# Patient Record
Sex: FEMALE | Race: WHITE | NOT HISPANIC OR LATINO | URBAN - METROPOLITAN AREA
[De-identification: names, ages, dates, MRNs, and addresses within clinical notes are randomized per-mention and may not be internally consistent; named-entity substitution may affect disease eponyms.]

---

## 2017-06-13 ENCOUNTER — INPATIENT (INPATIENT)
Facility: HOSPITAL | Age: 42
LOS: 0 days | Discharge: STILL ADMITTED OR ADMINISTRATIVE | End: 2017-06-13
Attending: PSYCHIATRY & NEUROLOGY | Admitting: PSYCHIATRY & NEUROLOGY

## 2017-06-13 ENCOUNTER — OUTPATIENT (OUTPATIENT)
Dept: OUTPATIENT SERVICES | Facility: HOSPITAL | Age: 42
LOS: 1 days | Discharge: STILL ADMITTED OR ADMINISTRATIVE | End: 2017-06-13

## 2017-06-13 ENCOUNTER — INPATIENT (INPATIENT)
Facility: HOSPITAL | Age: 42
LOS: 1 days | Discharge: PSYCHIATRIC OR SUBSTANCE USE FACILITY | End: 2017-06-15
Attending: HOSPITALIST | Admitting: HOSPITALIST
Payer: COMMERCIAL

## 2017-06-13 VITALS
DIASTOLIC BLOOD PRESSURE: 80 MMHG | TEMPERATURE: 98 F | OXYGEN SATURATION: 96 % | RESPIRATION RATE: 16 BRPM | HEART RATE: 89 BPM | SYSTOLIC BLOOD PRESSURE: 127 MMHG

## 2017-06-13 DIAGNOSIS — C71.2 MALIGNANT NEOPLASM OF TEMPORAL LOBE: Chronic | ICD-10-CM

## 2017-06-13 DIAGNOSIS — F31.10 BIPOLAR DISORDER, CURRENT EPISODE MANIC WITHOUT PSYCHOTIC FEATURES, UNSPECIFIED: ICD-10-CM

## 2017-06-13 LAB
ALBUMIN SERPL ELPH-MCNC: 4.5 G/DL — SIGNIFICANT CHANGE UP (ref 3.3–5)
ALP SERPL-CCNC: 112 U/L — SIGNIFICANT CHANGE UP (ref 40–120)
ALT FLD-CCNC: 11 U/L — SIGNIFICANT CHANGE UP (ref 4–33)
APAP SERPL-MCNC: < 15 UG/ML — LOW (ref 15–25)
APPEARANCE UR: CLEAR — SIGNIFICANT CHANGE UP
AST SERPL-CCNC: 17 U/L — SIGNIFICANT CHANGE UP (ref 4–32)
BARBITURATES MEASUREMENT: NEGATIVE — SIGNIFICANT CHANGE UP
BASOPHILS # BLD AUTO: 0.02 K/UL — SIGNIFICANT CHANGE UP (ref 0–0.2)
BASOPHILS NFR BLD AUTO: 0.3 % — SIGNIFICANT CHANGE UP (ref 0–2)
BENZODIAZ SERPL-MCNC: NEGATIVE — SIGNIFICANT CHANGE UP
BILIRUB SERPL-MCNC: 0.3 MG/DL — SIGNIFICANT CHANGE UP (ref 0.2–1.2)
BILIRUB UR-MCNC: NEGATIVE — SIGNIFICANT CHANGE UP
BLOOD UR QL VISUAL: NEGATIVE — SIGNIFICANT CHANGE UP
BUN SERPL-MCNC: 13 MG/DL — SIGNIFICANT CHANGE UP (ref 7–23)
CALCIUM SERPL-MCNC: 8.3 MG/DL — LOW (ref 8.4–10.5)
CHLORIDE SERPL-SCNC: 102 MMOL/L — SIGNIFICANT CHANGE UP (ref 98–107)
CHOLEST SERPL-MCNC: 236 MG/DL — HIGH (ref 120–199)
CO2 SERPL-SCNC: 20 MMOL/L — LOW (ref 22–31)
COLOR SPEC: YELLOW — SIGNIFICANT CHANGE UP
CREAT SERPL-MCNC: 1.06 MG/DL — SIGNIFICANT CHANGE UP (ref 0.5–1.3)
EOSINOPHIL # BLD AUTO: 0.05 K/UL — SIGNIFICANT CHANGE UP (ref 0–0.5)
EOSINOPHIL NFR BLD AUTO: 0.7 % — SIGNIFICANT CHANGE UP (ref 0–6)
ETHANOL BLD-MCNC: < 10 MG/DL — SIGNIFICANT CHANGE UP
GLUCOSE SERPL-MCNC: 78 MG/DL — SIGNIFICANT CHANGE UP (ref 70–99)
GLUCOSE UR-MCNC: NEGATIVE — SIGNIFICANT CHANGE UP
HBA1C BLD-MCNC: 6 % — HIGH (ref 4–5.6)
HCG SERPL-ACNC: < 5 MIU/ML — SIGNIFICANT CHANGE UP
HCT VFR BLD CALC: 36.1 % — SIGNIFICANT CHANGE UP (ref 34.5–45)
HDLC SERPL-MCNC: 40 MG/DL — LOW (ref 45–65)
HGB BLD-MCNC: 11.8 G/DL — SIGNIFICANT CHANGE UP (ref 11.5–15.5)
IMM GRANULOCYTES NFR BLD AUTO: 0.1 % — SIGNIFICANT CHANGE UP (ref 0–1.5)
KETONES UR-MCNC: NEGATIVE — SIGNIFICANT CHANGE UP
LEUKOCYTE ESTERASE UR-ACNC: NEGATIVE — SIGNIFICANT CHANGE UP
LIPID PNL WITH DIRECT LDL SERPL: 156 MG/DL — SIGNIFICANT CHANGE UP
LYMPHOCYTES # BLD AUTO: 1.18 K/UL — SIGNIFICANT CHANGE UP (ref 1–3.3)
LYMPHOCYTES # BLD AUTO: 16.5 % — SIGNIFICANT CHANGE UP (ref 13–44)
MAGNESIUM SERPL-MCNC: 2.1 MG/DL — SIGNIFICANT CHANGE UP (ref 1.6–2.6)
MCHC RBC-ENTMCNC: 27.9 PG — SIGNIFICANT CHANGE UP (ref 27–34)
MCHC RBC-ENTMCNC: 32.7 % — SIGNIFICANT CHANGE UP (ref 32–36)
MCV RBC AUTO: 85.3 FL — SIGNIFICANT CHANGE UP (ref 80–100)
MONOCYTES # BLD AUTO: 0.48 K/UL — SIGNIFICANT CHANGE UP (ref 0–0.9)
MONOCYTES NFR BLD AUTO: 6.7 % — SIGNIFICANT CHANGE UP (ref 2–14)
MUCOUS THREADS # UR AUTO: SIGNIFICANT CHANGE UP
NEUTROPHILS # BLD AUTO: 5.4 K/UL — SIGNIFICANT CHANGE UP (ref 1.8–7.4)
NEUTROPHILS NFR BLD AUTO: 75.7 % — SIGNIFICANT CHANGE UP (ref 43–77)
NITRITE UR-MCNC: NEGATIVE — SIGNIFICANT CHANGE UP
PH UR: 5.5 — SIGNIFICANT CHANGE UP (ref 4.6–8)
PHOSPHATE SERPL-MCNC: 5 MG/DL — HIGH (ref 2.5–4.5)
PLATELET # BLD AUTO: 188 K/UL — SIGNIFICANT CHANGE UP (ref 150–400)
PMV BLD: 12.1 FL — SIGNIFICANT CHANGE UP (ref 7–13)
POTASSIUM SERPL-MCNC: 3.9 MMOL/L — SIGNIFICANT CHANGE UP (ref 3.5–5.3)
POTASSIUM SERPL-SCNC: 3.9 MMOL/L — SIGNIFICANT CHANGE UP (ref 3.5–5.3)
PROT SERPL-MCNC: 7 G/DL — SIGNIFICANT CHANGE UP (ref 6–8.3)
PROT UR-MCNC: 10 — SIGNIFICANT CHANGE UP
RBC # BLD: 4.23 M/UL — SIGNIFICANT CHANGE UP (ref 3.8–5.2)
RBC # FLD: 14 % — SIGNIFICANT CHANGE UP (ref 10.3–14.5)
RBC CASTS # UR COMP ASSIST: SIGNIFICANT CHANGE UP (ref 0–?)
SALICYLATES SERPL-MCNC: < 5 MG/DL — LOW (ref 15–30)
SODIUM SERPL-SCNC: 143 MMOL/L — SIGNIFICANT CHANGE UP (ref 135–145)
SP GR SPEC: 1.01 — SIGNIFICANT CHANGE UP (ref 1–1.03)
SQUAMOUS # UR AUTO: SIGNIFICANT CHANGE UP
TRIGL SERPL-MCNC: 193 MG/DL — HIGH (ref 10–149)
TSH SERPL-MCNC: 1.68 UIU/ML — SIGNIFICANT CHANGE UP (ref 0.27–4.2)
UROBILINOGEN FLD QL: NORMAL E.U. — SIGNIFICANT CHANGE UP (ref 0.1–0.2)
WBC # BLD: 7.14 K/UL — SIGNIFICANT CHANGE UP (ref 3.8–10.5)
WBC # FLD AUTO: 7.14 K/UL — SIGNIFICANT CHANGE UP (ref 3.8–10.5)
WBC UR QL: SIGNIFICANT CHANGE UP (ref 0–?)

## 2017-06-13 PROCEDURE — 90792 PSYCH DIAG EVAL W/MED SRVCS: CPT

## 2017-06-13 PROCEDURE — 70450 CT HEAD/BRAIN W/O DYE: CPT | Mod: 26

## 2017-06-13 RX ORDER — ALPRAZOLAM 0.25 MG
1 TABLET ORAL ONCE
Qty: 0 | Refills: 0 | Status: DISCONTINUED | OUTPATIENT
Start: 2017-06-13 | End: 2017-06-13

## 2017-06-13 RX ADMIN — Medication 1 MILLIGRAM(S): at 23:34

## 2017-06-13 NOTE — ED ADULT NURSE NOTE - OBJECTIVE STATEMENT
Pt presents to ED r#24 Aox4, in NAD, sent in by [facility] intake prior to admission to [facility] for CT head/ medical clearance. Parents states pt has been more manic, increased destructive and erratic behaviors. ON chemo/ radiation for brain CA, CT head every 3 months, last on May 13 (s/p fall r/t low BP 70/40, negative for acute changes). MRI on May 17 also showed no changes, as per family. Family also reports decreased PO intake since yesterday. Pt denies pain/ N/V/ CP/ SOB/ other acute complaints. Pt is tearful at this time, otherwise cooperative, respirations even and unlabored, VSS. MD at bedside. Will continue to monitor.

## 2017-06-13 NOTE — ED PROVIDER NOTE - MEDICAL DECISION MAKING DETAILS
39F with PMHx astrocytoma p/w worsening behavioral changes concerning for uncontrolled bipolar d/o vs. organic behavioral abnormality 2/2 underlying malignancy with resection. CT Head, labs, EKG, neuro consult to r/o seizures. Re-assess. GRAEME aware

## 2017-06-13 NOTE — ED PROVIDER NOTE - OBJECTIVE STATEMENT
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 History obtained  mostly from father at bedside. As per father, pt has been seeing a neuropsychologist (Dr. Brittney Estes, [place]) and a clinical psychologist weekly. Her neuropsychologist recently changes (to Dr. Estes's  Dandre Franklyn) and has possibly made some medication changes but is not sure of the details. Pt receives regular screening MRIs and CT Heads, last CT Head unchanged 5/13/17 and MRI Head 3 weeks ago also unchanged from baseline.     Pt was to be voluntarily admitted to inpatient [facility] for destructive behavior and hypersexuality with large mood swings thought to possibly be Bipolar D/o. Over the past few weeks she has been performing erratic behaviors, such as taking out money from her bank account and giving it out to her friends, exchanging her flakito ring for a cubic zirconium, and being self-destructive. She is  with 2 children.     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 History obtained  mostly from father at bedside. As per father, pt has been seeing a neuropsychologist (Dr. Brittney Estes, [place]) and a clinical psychologist weekly. Her neuropsychologist recently changes (to Dr. Estes's  Dr. Dandre Estes) and has possibly made some medication changes but is not sure of the details. Pt receives regular screening MRIs and CT Heads, last CT Head unchanged 5/13/17 and MRI Head 3 weeks ago also unchanged from baseline.     Pt was to be voluntarily admitted to inpatient [facility] for destructive behavior and hypersexuality with large mood swings thought to possibly be Bipolar D/o. Over the past few years (since her sx) she has been performing erratic behaviors, such as taking out money from her bank account and giving it out to her friends, exchanging her grandmother's flakito ring for fake, and crying for no reason. She is  with 2 children.     Meds: Fluoxetine 60mg daily, Topamax 225mg BID (125mg am, 100mg qhs), Abilify 30mg QD, Adderall 30mg BID, Xanax 1mg BID or TID, Nexium 40mg QD, Probiotic QD Pt is a 42F with PMHx astrocytoma (right temporal lobe) s/p resection x2 c/b perioperative CVA (2015) and adjuvant chemotherapy/radiation ([facility]) p/w worsening behavioral changes admitted to [facility] earlier today but then transferred to [facility] ED for medical clearance prior to inpatient psych admission. History obtained  mostly from father at bedside. As per father, pt has been seeing a neuropsychologist (Dr. Brittney Estes, [place]) and a clinical psychologist weekly. Her neuropsychologist recently changes (to Dr. Estes's  Dr. Dandre Estes) and has possibly made some medication changes but is not sure of the details. Pt receives regular screening MRIs and CT Heads, last CT Head unchanged 5/13/17 and MRI Head 3 weeks ago also unchanged from baseline.     Pt was to be voluntarily admitted to inpatient [facility] for destructive behavior and hypersexuality with large mood swings thought to possibly be Bipolar D/o. Over the past few years (since her sx) she has been performing erratic behaviors, such as taking out money from her bank account and giving it out to her friends, exchanging her grandmother's flakito ring for fake, and crying for no reason. She is  with 2 children.     Meds: Fluoxetine 60mg daily, Topamax 225mg BID (125mg am, 100mg qhs), Abilify 30mg QD, Adderall 30mg BID, Xanax 1mg BID or TID, Nexium 40mg QD, Probiotic QD    Father (Dr. Fernando) [phone]

## 2017-06-13 NOTE — ED PROVIDER NOTE - ATTENDING CONTRIBUTION TO CARE
AJM: Pt seen with resident and agree with above note. Pt is a 42F with PMHx astrocytoma (right temporal lobe) s/p resection x2 c/b perioperative CVA (2015) and adjuvant chemotherapy/radiation ([facility]) p/w worsening behavioral changes admitted to [facility] earlier today but then transferred to [facility] ED for medical clearance prior to inpatient psych admission. Sent here for ct head. Pt with symptoms c/w ashley, spending, hypersexuality, labile affect. psych was concerned symptoms might be due to possible temporal seziures given pts medical history and lack of prior psych issues. requesting neuro eval with psych following. labs already drawn by psych. head ct, neuro consult. admit. no SI at this time.

## 2017-06-13 NOTE — ED ADULT TRIAGE NOTE - CHIEF COMPLAINT QUOTE
EMS states" patient was sent in from crisis center with change in behavior to get checked by psychiatrist" .h/o cancer brain with 2 surgeries in the past with chemo/radiation. patient also has bipolar history. patient also fell on May 13th and last MRI was 3 weeks ago with no changes.c/o head ache. as per father patient is not making right decisions

## 2017-06-13 NOTE — ED PROVIDER NOTE - EYES, MLM
Clear bilaterally, pupils equal, round and reactive to light. Decreased peripheral vision superiorly, R>L

## 2017-06-13 NOTE — ED PROVIDER NOTE - PMH
Anxiety    Asthma  never intubated or hospitalized for ot, uses symbicort and proventil  Astrocytoma  Temporal lobe  Chronic sinusitis    Dislocation of shoulder  x 4 -  2000 to 2005  GERD (gastroesophageal reflux disease)    Hypothyroidism    Obesity (BMI 30-39.9)    Spine fracture  lumbar 2005

## 2017-06-14 DIAGNOSIS — R41.82 ALTERED MENTAL STATUS, UNSPECIFIED: ICD-10-CM

## 2017-06-14 DIAGNOSIS — F41.9 ANXIETY DISORDER, UNSPECIFIED: ICD-10-CM

## 2017-06-14 DIAGNOSIS — K21.9 GASTRO-ESOPHAGEAL REFLUX DISEASE WITHOUT ESOPHAGITIS: ICD-10-CM

## 2017-06-14 DIAGNOSIS — E03.9 HYPOTHYROIDISM, UNSPECIFIED: ICD-10-CM

## 2017-06-14 DIAGNOSIS — F12.10 CANNABIS ABUSE, UNCOMPLICATED: ICD-10-CM

## 2017-06-14 DIAGNOSIS — F69 UNSPECIFIED DISORDER OF ADULT PERSONALITY AND BEHAVIOR: ICD-10-CM

## 2017-06-14 DIAGNOSIS — F39 UNSPECIFIED MOOD [AFFECTIVE] DISORDER: ICD-10-CM

## 2017-06-14 DIAGNOSIS — C71.9 MALIGNANT NEOPLASM OF BRAIN, UNSPECIFIED: ICD-10-CM

## 2017-06-14 DIAGNOSIS — Z29.9 ENCOUNTER FOR PROPHYLACTIC MEASURES, UNSPECIFIED: ICD-10-CM

## 2017-06-14 DIAGNOSIS — F31.10 BIPOLAR DISORDER, CURRENT EPISODE MANIC WITHOUT PSYCHOTIC FEATURES, UNSPECIFIED: ICD-10-CM

## 2017-06-14 DIAGNOSIS — G93.41 METABOLIC ENCEPHALOPATHY: ICD-10-CM

## 2017-06-14 LAB
AMPHET UR-MCNC: NEGATIVE — SIGNIFICANT CHANGE UP
BARBITURATES UR SCN-MCNC: NEGATIVE — SIGNIFICANT CHANGE UP
BASOPHILS # BLD AUTO: 0 K/UL — SIGNIFICANT CHANGE UP (ref 0–0.2)
BASOPHILS NFR BLD AUTO: 0 % — SIGNIFICANT CHANGE UP (ref 0–2)
BENZODIAZ UR-MCNC: NEGATIVE — SIGNIFICANT CHANGE UP
BUN SERPL-MCNC: 7 MG/DL — SIGNIFICANT CHANGE UP (ref 7–23)
CALCIUM SERPL-MCNC: 9 MG/DL — SIGNIFICANT CHANGE UP (ref 8.4–10.5)
CANNABINOIDS UR-MCNC: POSITIVE — SIGNIFICANT CHANGE UP
CHLORIDE SERPL-SCNC: 103 MMOL/L — SIGNIFICANT CHANGE UP (ref 98–107)
CO2 SERPL-SCNC: 21 MMOL/L — LOW (ref 22–31)
COCAINE METAB.OTHER UR-MCNC: NEGATIVE — SIGNIFICANT CHANGE UP
CREAT SERPL-MCNC: 0.91 MG/DL — SIGNIFICANT CHANGE UP (ref 0.5–1.3)
EOSINOPHIL # BLD AUTO: 0.05 K/UL — SIGNIFICANT CHANGE UP (ref 0–0.5)
EOSINOPHIL NFR BLD AUTO: 1.4 % — SIGNIFICANT CHANGE UP (ref 0–6)
GLUCOSE SERPL-MCNC: 100 MG/DL — HIGH (ref 70–99)
HCT VFR BLD CALC: 33.3 % — LOW (ref 34.5–45)
HGB BLD-MCNC: 10.8 G/DL — LOW (ref 11.5–15.5)
HIV1 AG SER QL: SIGNIFICANT CHANGE UP
HIV1+2 AB SPEC QL: SIGNIFICANT CHANGE UP
IMM GRANULOCYTES NFR BLD AUTO: 0.3 % — SIGNIFICANT CHANGE UP (ref 0–1.5)
LYMPHOCYTES # BLD AUTO: 0.6 K/UL — LOW (ref 1–3.3)
LYMPHOCYTES # BLD AUTO: 16.9 % — SIGNIFICANT CHANGE UP (ref 13–44)
MAGNESIUM SERPL-MCNC: 2.1 MG/DL — SIGNIFICANT CHANGE UP (ref 1.6–2.6)
MCHC RBC-ENTMCNC: 27.1 PG — SIGNIFICANT CHANGE UP (ref 27–34)
MCHC RBC-ENTMCNC: 32.4 % — SIGNIFICANT CHANGE UP (ref 32–36)
MCV RBC AUTO: 83.7 FL — SIGNIFICANT CHANGE UP (ref 80–100)
METHADONE UR-MCNC: NEGATIVE — SIGNIFICANT CHANGE UP
MONOCYTES # BLD AUTO: 0.46 K/UL — SIGNIFICANT CHANGE UP (ref 0–0.9)
MONOCYTES NFR BLD AUTO: 12.9 % — SIGNIFICANT CHANGE UP (ref 2–14)
NEUTROPHILS # BLD AUTO: 2.44 K/UL — SIGNIFICANT CHANGE UP (ref 1.8–7.4)
NEUTROPHILS NFR BLD AUTO: 68.5 % — SIGNIFICANT CHANGE UP (ref 43–77)
OPIATES UR-MCNC: NEGATIVE — SIGNIFICANT CHANGE UP
OXYCODONE UR-MCNC: NEGATIVE — SIGNIFICANT CHANGE UP
PCP UR-MCNC: NEGATIVE — SIGNIFICANT CHANGE UP
PHOSPHATE SERPL-MCNC: 3 MG/DL — SIGNIFICANT CHANGE UP (ref 2.5–4.5)
PLATELET # BLD AUTO: 151 K/UL — SIGNIFICANT CHANGE UP (ref 150–400)
PMV BLD: 11.1 FL — SIGNIFICANT CHANGE UP (ref 7–13)
POTASSIUM SERPL-MCNC: 4 MMOL/L — SIGNIFICANT CHANGE UP (ref 3.5–5.3)
POTASSIUM SERPL-SCNC: 4 MMOL/L — SIGNIFICANT CHANGE UP (ref 3.5–5.3)
RBC # BLD: 3.98 M/UL — SIGNIFICANT CHANGE UP (ref 3.8–5.2)
RBC # FLD: 13.8 % — SIGNIFICANT CHANGE UP (ref 10.3–14.5)
SODIUM SERPL-SCNC: 137 MMOL/L — SIGNIFICANT CHANGE UP (ref 135–145)
T PALLIDUM AB TITR SER: NEGATIVE — SIGNIFICANT CHANGE UP
T4 FREE SERPL-MCNC: 0.93 NG/DL — SIGNIFICANT CHANGE UP (ref 0.9–1.8)
WBC # BLD: 3.56 K/UL — LOW (ref 3.8–10.5)
WBC # FLD AUTO: 3.56 K/UL — LOW (ref 3.8–10.5)

## 2017-06-14 PROCEDURE — 93010 ELECTROCARDIOGRAM REPORT: CPT

## 2017-06-14 PROCEDURE — 12345: CPT | Mod: NC

## 2017-06-14 PROCEDURE — 99223 1ST HOSP IP/OBS HIGH 75: CPT

## 2017-06-14 PROCEDURE — 95819 EEG AWAKE AND ASLEEP: CPT | Mod: 26

## 2017-06-14 PROCEDURE — 99223 1ST HOSP IP/OBS HIGH 75: CPT | Mod: GC

## 2017-06-14 RX ORDER — DEXTROAMPHETAMINE SACCHARATE, AMPHETAMINE ASPARTATE, DEXTROAMPHETAMINE SULFATE AND AMPHETAMINE SULFATE 1.875; 1.875; 1.875; 1.875 MG/1; MG/1; MG/1; MG/1
15 TABLET ORAL
Qty: 0 | Refills: 0 | Status: DISCONTINUED | OUTPATIENT
Start: 2017-06-14 | End: 2017-06-15

## 2017-06-14 RX ORDER — LACTOBACILLUS ACIDOPHILUS 100MM CELL
1 CAPSULE ORAL
Qty: 0 | Refills: 0 | Status: DISCONTINUED | OUTPATIENT
Start: 2017-06-14 | End: 2017-06-15

## 2017-06-14 RX ORDER — IBUPROFEN 200 MG
600 TABLET ORAL EVERY 6 HOURS
Qty: 0 | Refills: 0 | Status: DISCONTINUED | OUTPATIENT
Start: 2017-06-14 | End: 2017-06-15

## 2017-06-14 RX ORDER — TOPIRAMATE 25 MG
125 TABLET ORAL DAILY
Qty: 0 | Refills: 0 | Status: DISCONTINUED | OUTPATIENT
Start: 2017-06-14 | End: 2017-06-15

## 2017-06-14 RX ORDER — TOPIRAMATE 25 MG
100 TABLET ORAL AT BEDTIME
Qty: 0 | Refills: 0 | Status: DISCONTINUED | OUTPATIENT
Start: 2017-06-14 | End: 2017-06-15

## 2017-06-14 RX ORDER — DEXTROAMPHETAMINE SACCHARATE, AMPHETAMINE ASPARTATE, DEXTROAMPHETAMINE SULFATE AND AMPHETAMINE SULFATE 1.875; 1.875; 1.875; 1.875 MG/1; MG/1; MG/1; MG/1
30 TABLET ORAL
Qty: 0 | Refills: 0 | Status: DISCONTINUED | OUTPATIENT
Start: 2017-06-14 | End: 2017-06-14

## 2017-06-14 RX ORDER — ALPRAZOLAM 0.25 MG
1 TABLET ORAL THREE TIMES A DAY
Qty: 0 | Refills: 0 | Status: DISCONTINUED | OUTPATIENT
Start: 2017-06-14 | End: 2017-06-15

## 2017-06-14 RX ORDER — FLUOXETINE HCL 10 MG
60 CAPSULE ORAL DAILY
Qty: 0 | Refills: 0 | Status: DISCONTINUED | OUTPATIENT
Start: 2017-06-14 | End: 2017-06-15

## 2017-06-14 RX ORDER — ENOXAPARIN SODIUM 100 MG/ML
40 INJECTION SUBCUTANEOUS DAILY
Qty: 0 | Refills: 0 | Status: DISCONTINUED | OUTPATIENT
Start: 2017-06-14 | End: 2017-06-15

## 2017-06-14 RX ORDER — ARIPIPRAZOLE 15 MG/1
30 TABLET ORAL DAILY
Qty: 0 | Refills: 0 | Status: DISCONTINUED | OUTPATIENT
Start: 2017-06-14 | End: 2017-06-15

## 2017-06-14 RX ORDER — PANTOPRAZOLE SODIUM 20 MG/1
40 TABLET, DELAYED RELEASE ORAL
Qty: 0 | Refills: 0 | Status: DISCONTINUED | OUTPATIENT
Start: 2017-06-14 | End: 2017-06-15

## 2017-06-14 RX ADMIN — DEXTROAMPHETAMINE SACCHARATE, AMPHETAMINE ASPARTATE, DEXTROAMPHETAMINE SULFATE AND AMPHETAMINE SULFATE 30 MILLIGRAM(S): 1.875; 1.875; 1.875; 1.875 TABLET ORAL at 07:42

## 2017-06-14 RX ADMIN — Medication 1 MILLIGRAM(S): at 21:52

## 2017-06-14 RX ADMIN — PANTOPRAZOLE SODIUM 40 MILLIGRAM(S): 20 TABLET, DELAYED RELEASE ORAL at 06:59

## 2017-06-14 RX ADMIN — Medication 1 TABLET(S): at 09:25

## 2017-06-14 RX ADMIN — Medication 60 MILLIGRAM(S): at 12:25

## 2017-06-14 RX ADMIN — Medication 125 MILLIGRAM(S): at 14:06

## 2017-06-14 RX ADMIN — Medication 1 MILLIGRAM(S): at 09:25

## 2017-06-14 RX ADMIN — Medication 600 MILLIGRAM(S): at 14:00

## 2017-06-14 RX ADMIN — Medication 1 TABLET(S): at 17:45

## 2017-06-14 RX ADMIN — ARIPIPRAZOLE 30 MILLIGRAM(S): 15 TABLET ORAL at 14:05

## 2017-06-14 RX ADMIN — DEXTROAMPHETAMINE SACCHARATE, AMPHETAMINE ASPARTATE, DEXTROAMPHETAMINE SULFATE AND AMPHETAMINE SULFATE 15 MILLIGRAM(S): 1.875; 1.875; 1.875; 1.875 TABLET ORAL at 17:45

## 2017-06-14 RX ADMIN — Medication 600 MILLIGRAM(S): at 13:04

## 2017-06-14 RX ADMIN — ENOXAPARIN SODIUM 40 MILLIGRAM(S): 100 INJECTION SUBCUTANEOUS at 12:25

## 2017-06-14 RX ADMIN — Medication 100 MILLIGRAM(S): at 21:52

## 2017-06-14 NOTE — H&P ADULT - NSHPLABSRESULTS_GEN_ALL_CORE
EKG NSR 82, QTc 486    11.8   7.14  )-----------( 188      ( 13 Jun 2017 20:00 )             36.1     06-13    143  |  102  |  13  ----------------------------<  78  3.9   |  20<L>  |  1.06    Ca    8.3<L>      13 Jun 2017 20:00  Phos  5.0     06-13  Mg     2.1     06-13    TPro  7.0  /  Alb  4.5  /  TBili  0.3  /  DBili  x   /  AST  17  /  ALT  11  /  AlkPhos  112  06-13

## 2017-06-14 NOTE — BEHAVIORAL HEALTH ASSESSMENT NOTE - CASE SUMMARY
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 p/w worsening behavioral changes. She presented to [facility] crisis clinic yesterday with intention of inpatient admission to [place] [address], but was transferred to [facility] for medical/neuro workup given her h/o astrocytoma and no recent EEG. Pt with complex clinical presentation and time-course, having what appears to be a baseline impulsive personality, premorbid (before brain tumor) psychiatric issues for which she was prescribed both Adderall and Xanax, and then hypomanic vs manic psychiatric symptoms occurring s/p astrocytoma diagnosis, in addition to ongoing cannabis abuse. While the patient maintains reality testing and clearly understands she is not acting like herself, she describes that in the moment at points she is "out of control" and not able to stop herself from stealing and/or buying frivolous things. She is A&Ox3 and at this time denies any SI/HI, no naomi psychotic symptoms or ashley, but does exhibit marked mood lability throughout the interview. Imp: mood disorder NOS; r/o ashley vs hypomania 2/2 brain tumor, r/o bipolar spectrum disorder, r/o amphetamine abuse; cannabis abuse. Plan: as above- will taper Adderall dose given that pt describes being Rx'd Adderall prior to 2014 for a hypersomnic sleep condition that she feels has improved at this point, also that it may be contributing to her manic symptoms. Continue Prozac and Abilify for now, continue Xanax prns. Will need further collateral from pt's outpt psychiatrist,  and father. Pt will likely require [facility] transfer once she's medically stable. Will continue to follow closely.

## 2017-06-14 NOTE — BEHAVIORAL HEALTH ASSESSMENT NOTE - OTHER PAST PSYCHIATRIC HISTORY (INCLUDE DETAILS REGARDING ONSET, COURSE OF ILLNESS, INPATIENT/OUTPATIENT TREATMENT)
No admission, no suicide attempts, but was being seen as an outpatient for anxiety and "concentration" issues in the context of marital conflict.

## 2017-06-14 NOTE — BEHAVIORAL HEALTH ASSESSMENT NOTE - NSBHCHARTREVIEWLAB_PSY_A_CORE FT
10.8   3.56  )-----------( 151      ( 2017 05:54 )             33.3     06-14    137  |  103  |  7   ----------------------------<  100<H>  4.0   |  21<L>  |  0.91    Ca    9.0      2017 05:54  Phos  3.0     06-  Mg     2.1     -    TPro  7.0  /  Alb  4.5  /  TBili  0.3  /  DBili  x   /  AST  17  /  ALT  11  /  AlkPhos  112  06-13    LIVER FUNCTIONS - ( 2017 20:00 )  Alb: 4.5 g/dL / Pro: 7.0 g/dL / ALK PHOS: 112 u/L / ALT: 11 u/L / AST: 17 u/L / GGT: x             Urinalysis Basic - ( 2017 20:00 )    Color: YELLOW / Appearance: CLEAR / S.015 / pH: 5.5  Gluc: NEGATIVE / Ketone: NEGATIVE  / Bili: NEGATIVE / Urobili: NORMAL E.U.   Blood: NEGATIVE / Protein: 10 / Nitrite: NEGATIVE   Leuk Esterase: NEGATIVE / RBC: 0-2 / WBC 2-5   Sq Epi: OCC / Non Sq Epi: x / Bacteria: x

## 2017-06-14 NOTE — H&P ADULT - NEUROLOGICAL DETAILS
responds to pain/normal strength/alert and oriented x 3/responds to verbal commands/cranial nerves intact

## 2017-06-14 NOTE — CONSULT NOTE ADULT - ASSESSMENT
42 year old female with right temporal astrocytoma s/p 2 resections and chemotherapy who presents with increasing behavioral changes who was attempting to voluntarily admit herself to [facility], but is requiring work-up for episodic behavior to rule out medical/neurologic causes. Concern for temporal lobe seizures as these events are episodic with periods of increased fatigue suggestive of possible post-ictal periods.

## 2017-06-14 NOTE — H&P ADULT - ATTENDING COMMENTS
41 y/o female HX of Astrocytoma, S/P Surgery , Chemo TX, RTX, complicated with Perioperative CVA, pt with Change of behavior , inappropriate acts and behavior, admitted for evaluation, no fever, + HA, R/O Bipolar, High Cholesterol GERD, CT Head No Acute findings noted , TSH 1.68, UA Neg.,     Neuro Consult, EEG, enhanced supervision, Fall precaution, Psych Consult, DVT Prophylaxis : SQ Lovenox, Continue Psych meds, CBC, CMP, HIV  Test, RPR, Urine TOX    Case D/W Pt, HS,     Pt was seen & Examined by me, Dr. SAMANTHA Og on 6/14/17.

## 2017-06-14 NOTE — BEHAVIORAL HEALTH ASSESSMENT NOTE - HPI (INCLUDE ILLNESS QUALITY, SEVERITY, DURATION, TIMING, CONTEXT, MODIFYING FACTORS, ASSOCIATED SIGNS AND SYMPTOMS)
42F with PMHx astrocytoma (right temporal lobe) s/p resection x2 c/b perioperative CVA (2015) and adjuvant chemotherapy/radiation ([facility]) p/w worsening behavioral changes. She presented to [facility] crisis clinic yesterday with intention of inpatient admission to [place] [address], but was transferred to [facility] for medical admission.     Per sign out pt with symptoms c/w ashley, spending, hypersexuality, labile affect. Initial psych eval stated concern that symptoms might be due to possible temporal seizures given pts medical history and lack of prior psych issues. No Utox upon admission, serum tox negative for BZD/barbiruates, undetectable BAL.  Patient admits to smoking daily marijuana per record. Neurology was already consulted and recommending vEEG as patient has not had study prior to surgery, and symptoms could be secondary to temporal lobe seizures. Of note, no PRNs needed since being at the [facility] or at [place] evaluation.       Upon presentation, 42F with PMHx astrocytoma (right temporal lobe) s/p resection x2 c/b perioperative CVA (2015) and adjuvant chemotherapy/radiation ([facility]) p/w worsening behavioral changes. She presented to [facility] crisis clinic yesterday with intention of inpatient admission to [place] [address], but was transferred to [facility] for medical admission.     Per sign out pt with symptoms c/w ashley, spending, hypersexuality, labile affect. Initial psych eval stated concern that symptoms might be due to possible temporal seizures given pts medical history and lack of prior psych issues. No Utox upon admission, serum tox negative for BZD/barbituates, undetectable BAL.  Patient admits to smoking daily marijuana per record. Neurology was already consulted and recommending vEEG as patient has not had study prior to surgery, and symptoms could be secondary to temporal lobe seizures. Of note, no emergent antipsychotic or IM PRNs needed since being at the [facility] or at [facility].       Upon presentation, patient is found awake and in bed, begins to cry when writer introduces himself, but quickly stops and becomes euthymic with further questioning. Patient explains that she has been acting more impulsive in the past several months to where her  had to take away access to bank accounts and credit cards. The patient explains she has "lost control" and has the urge to steal and/or buy at times frivolous things. For example she went to [employer] the other day to buy her daughter a new shirt, but also stole a pair of underwear for herself, which she states she couldn't control. Patient also recently got her engagement ring "appraised" where it was subsequently stolen. Patient believes this lack of judgement and impulsivity are out of proportion to the past, but does recognize she had similar symptoms around the births of her children, but to a lesser degree.  The patient is also reporting increased sexual activity and the feeling that "I've fallen in love with multiple men" which is out of proportion to the past. Patient reports that in the past her and her  led a non- monogamous lifestyle where she would frequently engage in sexual acts with other men for satisfaction for both her and her . This stopped around the time of her neurosurgical procedure ([id number]) but has since reoccurred with increased intensity compared to before since September 2016 to her husbands disapproval. She states she has slept with 8 separate men since then, and has fallen in love with all of them. Patient also reports leaving the house multiple times, almost spontaneously, to go smoke marijuana with her male friends, leaving her children at home with her .  Despite these she denies decreased need for sleep, and psychotic symptoms including ideas of reference and perceptual disturbances.  She states she is miserable because she might lose custody of her children due to the way she has been acting, but denies anhedonia, denies any suicidality. Patient admits to smoking approximately 1 gram of marijuana daily, denies all other elicit substance abuse, or misuse of Rx drugs. She is currently oriented in all spheres, denies any epileptic activity since time of her neurosurgical procedure when astrocytoma was diagnosed/treated.      Of note patient has been on longstanding Adderall for concentration and "hypersomnia" after a sleep study, but continued by a psychiatrist. This was prescribed before finding the astrocytoma. She also was on Xanax before the astrocytoma and was seeing a psychiatrist for marital issues. Since the astrocytoma she was prescribed Abilify for what appear to be mood lability. She has been in weekly therapy pre and post operatively.

## 2017-06-14 NOTE — EEG REPORT - NS EEG TEXT BOX
6/14/2017    Indication: Concern for seizures    FINDINGS:  The background was continuous, spontaneously variable and reactive.  During wakefulness, the posteriorly dominant rhythm consisted of 7-8 Hz activity, with an amplitude to 30 uV, that attenuated to eye opening.  There is continuous irregular generalized theta and delta activity, more prominent over the right temporal region with higher amplitude and overriding faster frequency activity (breach effect).       Sleep Background:  Drowsiness was characterized by fragmentation, attenuation, and slowing of the background activity.    Sleep was characterized by the presence of vertex waves, symmetric spindles, and K-complexes.    Epileptiform Activity:   No epileptiform discharges were present.    Events:  No clinical events were recorded.  No seizures were recorded.    Activation Procedures:   -Hyperventilation was not performed.    -Photic stimulation was not performed.    Artifacts:  Intermittent myogenic and movement artifacts were noted.    ECG:  The heart rate on single channel ECG was predominantly between 70-80 BPM.    Compressed Spectral Array Digital Analysis    FINDINGS:  Compressed Spectral Array (CSA) data was reviewed separately and correlated with the electroencephalographic findings detailed above.  CSA showed a variable spectral pattern.  Areas of increased power in particular were reviewed in detail, and compared with the raw EEG data.  Areas of abrupt increases in spectral power were reviewed to exclude seizures, and were determined to be artifactual in nature.    The relative ratio of the power of delta range frequencies and faster frequencies remained stable over the course of the study.  There was no definitive increase in the relative power in the delta frequency spectrum apparent in the left hemisphere versus the right hemisphere.      Compressed Spectral Array (Digital Analysis) Summary/ Impression:  No persistent hemispheric asymmetry.  Intermittent areas of increased power reviewed, without definite epileptiform activity associated on CSA.      EEG Classification / Summary:  Abnormal Routine EEG Study   -moderate generalized slowing, more prominent over right temporal region  - higher amplitude and fast frequency activity, right (breach effect)    Clinical Impression:  Findings indicate moderate diffuse or multifocal cerebral dysfunction, worse in right temporal region. Breach effect indicative of local skull defect. There were no epileptiform abnormalities recorded.      PRELIM READ BY FELLOW 6/14/2017    Indication: Concern for seizures    FINDINGS:  The background was continuous, spontaneously variable and reactive.  During wakefulness, the posteriorly dominant rhythm consisted of 7-8 Hz activity, with an amplitude to 30 uV, that attenuated to eye opening.  There is continuous irregular generalized theta and delta activity, more prominent over the right temporal region with higher amplitude and overriding faster frequency activity (breach effect).       Sleep Background:  Drowsiness was characterized by fragmentation, attenuation, and slowing of the background activity.    Sleep was characterized by the presence of vertex waves, symmetric spindles, and K-complexes.    Epileptiform Activity:   No epileptiform discharges were present.    Events:  No clinical events were recorded.  No seizures were recorded.    Activation Procedures:   -Hyperventilation was not performed.    -Photic stimulation was not performed.    Artifacts:  Intermittent myogenic and movement artifacts were noted.    ECG:  The heart rate on single channel ECG was predominantly between 70-80 BPM.    Compressed Spectral Array Digital Analysis    FINDINGS:  Compressed Spectral Array (CSA) data was reviewed separately and correlated with the electroencephalographic findings detailed above.  CSA showed a variable spectral pattern.  Areas of increased power in particular were reviewed in detail, and compared with the raw EEG data.  Areas of abrupt increases in spectral power were reviewed to exclude seizures, and were determined to be artifactual in nature.    The relative ratio of the power of delta range frequencies and faster frequencies remained stable over the course of the study.  There was no definitive increase in the relative power in the delta frequency spectrum apparent in the left hemisphere versus the right hemisphere.      Compressed Spectral Array (Digital Analysis) Summary/ Impression:  No persistent hemispheric asymmetry.  Intermittent areas of increased power reviewed, without definite epileptiform activity associated on CSA.      EEG Classification / Summary:  Abnormal Routine EEG Study   -moderate generalized slowing, more prominent over right temporal region  - higher amplitude and fast frequency activity, right (breach effect)    Clinical Impression:  Findings indicate moderate diffuse or multifocal cerebral dysfunction, worse in right temporal region. Breach effect indicative of local skull defect. There were no epileptiform abnormalities recorded.

## 2017-06-14 NOTE — H&P ADULT - HISTORY OF PRESENT ILLNESS
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 Patient was initially admitted to [facility] earlier today but was transferred to [facility] ED for medical clearance prior to inpatient psych admission for destructive behavior and hypersexuality and labile affect.     On arrival to the ED, VS were T 98.5, HR 89, /80, RR 16, SO2 96% on RA. Labs notable for no leukocytosis WBC 7.14, Hb 11.8, Bicarb 20, Creatinine 1.06, TSH 1.68, Chol 236, Trig 193, HbA1c 6, P 5. UA negative. Serum tox negative. CT head showed postoperative changes in the right temporal lobe, no acute intracranial hemorrhage or mass effect. In the ED, patient received Alprazolam 1mg PO x1. 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 Patient was initially admitted to [facility] earlier today but was transferred to [facility] ED for medical clearance prior to inpatient psych admission for destructive behavior and hypersexuality and labile affect.     Pt receives regular screening MRIs and CT Heads, last CT Head unchanged 5/13/17 and MRI Head 3 weeks ago also unchanged from baseline.    On arrival to the ED, VS were T 98.5, HR 89, /80, RR 16, SO2 96% on RA. Labs notable for no leukocytosis WBC 7.14, Hb 11.8, Bicarb 20, Creatinine 1.06, TSH 1.68, Chol 236, Trig 193, HbA1c 6, P 5. UA negative. Serum tox negative. CT head showed postoperative changes in the right temporal lobe, no acute intracranial hemorrhage or mass effect. In the ED, patient received Alprazolam 1mg PO x1. 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 Patient was initially admitted to [facility] earlier today but was transferred to [facility] ED for medical clearance prior to inpatient psych admission for destructive behavior, hypersexuality and labile affect. Patient currently has no complaints but repeats that "she keeps making bad decisions". Patient denies any fevers, chills, nausea, vomiting, diarrhea, vision changes, new medications, LOC, syncope. As per patent, she has MRIs and CT Heads about every 3 months. As per ED, father reports that last CT Head 5/13/17 was unchanged and last MRI Head 3 weeks ago was also unchanged from baseline. Patient currently follows with Dr. Estes, a neuropsychologist who she reports she feels is overmedicating her. Endorses having vivid dreams, describing one where her cats are playing in snow, but denies nay auditory or visual hallucinations while awake. Denies any HI or SI.     On arrival to the ED, VS were T 98.5, HR 89, /80, RR 16, SO2 96% on RA. Labs notable for no leukocytosis WBC 7.14, Hb 11.8, Bicarb 20, Creatinine 1.06, TSH 1.68, Chol 236, Trig 193, HbA1c 6, P 5. UA negative. Serum tox negative. CT head showed postoperative changes in the right temporal lobe, no acute intracranial hemorrhage or mass effect. In the ED, patient received Alprazolam 1mg PO x1.

## 2017-06-14 NOTE — H&P ADULT - PROBLEM SELECTOR PLAN 1
Progressive behavioral disturbances including labile mood, hypersexuality, destructive behavior due to uncontrolled Bipolar disorder vs adverse effect of Astrocytoma or surgical resection vs other undiagnosed psychiatric disease. CT head showed postoperative changes in the right temporal lobe, no acute intracranial hemorrhage or mass effect. TSH normal  - f/u neuro consult  - 24 hour EEG  - Check free T4, B12, Folate, HIV, RPR  - c/w home Xanax, Adderall, Abilify, Prozac, Topamax  - f/u Psych recs  - EEG as per neuro  - Monitor QTc Progressive behavioral disturbances including labile mood, hypersexuality, destructive behavior due to uncontrolled Bipolar disorder vs adverse effect of Astrocytoma or surgical resection vs other undiagnosed psychiatric disease vs chronic marijuana use. CT head showed postoperative changes in the right temporal lobe, no acute intracranial hemorrhage or mass effect. TSH normal  - f/u neuro consult  - 24 hour EEG  - Check free T4, B12, Folate, HIV, RPR  - c/w home Xanax, Adderall, Abilify, Prozac, Topamax  - f/u Psych recs  - EEG as per neuro  - Monitor QTc  - Check urine tox, serum tox negative

## 2017-06-14 NOTE — H&P ADULT - NEGATIVE GASTROINTESTINAL SYMPTOMS
no change in bowel habits/no vomiting/no constipation/no melena/no nausea/no hematochezia/no flatulence/no diarrhea/no abdominal pain

## 2017-06-14 NOTE — BEHAVIORAL HEALTH ASSESSMENT NOTE - NSBHCHARTREVIEWVS_PSY_A_CORE FT
Vital Signs Last 24 Hrs  T(C): 36.9, Max: 37 (06-14 @ 01:37)  T(F): 98.4, Max: 98.6 (06-14 @ 01:37)  HR: 88 (77 - 89)  BP: 99/60 (99/60 - 127/80)  BP(mean): --  RR: 18 (16 - 19)  SpO2: 100% (96% - 100%)

## 2017-06-14 NOTE — CONSULT NOTE ADULT - ATTENDING COMMENTS
Seen and examined on rounds.  No focal neurological deficits.  History of R temporal astrocytoma s/p resection and radiation 3 years prior.  Would check VEEG to assess for epileptiform activity that may contribute to recent behavioral changes.

## 2017-06-14 NOTE — H&P ADULT - GASTROINTESTINAL DETAILS
normal/soft/no masses palpable/bowel sounds normal/no distention/nontender nontender/bowel sounds normal/soft/no distention/no masses palpable

## 2017-06-14 NOTE — H&P ADULT - NSHPSOCIALHISTORY_GEN_ALL_CORE
Lives with    Has 2 daughters  Smoke marijuana daily since 2014  No ETOH use  Occasional tobacco use

## 2017-06-14 NOTE — BEHAVIORAL HEALTH ASSESSMENT NOTE - NSBHCONSULTMEDS_PSY_A_CORE FT
Decrease Adderall 15 mg BID    C/w Abilify 30 mg daily and Prozac 60 mg daily Decrease to Adderall 15 mg BID    C/w Abilify 30 mg daily and Prozac 60 mg daily

## 2017-06-14 NOTE — H&P ADULT - NEGATIVE CARDIOVASCULAR SYMPTOMS
no dyspnea on exertion/no paroxysmal nocturnal dyspnea/no chest pain/no peripheral edema/no orthopnea/no palpitations

## 2017-06-14 NOTE — CONSULT NOTE ADULT - PROBLEM SELECTOR RECOMMENDATION 2
Admit for video EEG as patient has not had one since pre-operatively. If any events or foci identified, will touch base with patients neuropsych, Dr. Lise Mills to discuss any medications changes.

## 2017-06-14 NOTE — PATIENT PROFILE ADULT. - MENTAL HEALTH CONDITIONS/SYMPTOMS, PROFILE
bipolar affective disorder/manic behavior/loss of interest in self/labile mood/obsessive-compulsive disorder/depression

## 2017-06-14 NOTE — BEHAVIORAL HEALTH ASSESSMENT NOTE - NSBHCHARTREVIEWIMAGING_PSY_A_CORE FT
EXAM:  CT BRAIN        PROCEDURE DATE:  Jun 13 2017         INTERPRETATION:  HISTORY: Altered mental status, brain cancer status post   tumor removal. History of stroke.    EXAM: A noncontrast head CT was performed. 5 mm axial images were   performed from the skull base to the vertex. Coronal and sagittal   reformations were provided.    COMPARISON: None.    FINDINGS:  Status post right pterional craniectomy, right parietal craniotomy and   mesh cranioplasty.    Right temporal postoperative cavity with large region of encephalomalacia   and gliosis in the right temporal lobe with associated ex vacuo   dilatation of the right lateral ventricle.    No acute intracranial hemorrhage, mass effect, or midline shift. No CT   evidence of acute territorial infarct.    The visualized portions of the paranasal sinuses and mastoid air cells   are clear.    IMPRESSION:    Postoperative changes in the right temporal lobe. No acute intracranial   hemorrhage or mass effect. If the patient has new and persistent   symptoms, consider short interval follow-up head CT or brain MRI   follow-up if there are no MRI contraindications.

## 2017-06-14 NOTE — H&P ADULT - ASSESSMENT
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

## 2017-06-14 NOTE — BEHAVIORAL HEALTH ASSESSMENT NOTE - SUMMARY
42F with PMHx astrocytoma (right temporal lobe) s/p resection x2 c/b perioperative CVA (2015) and adjuvant chemotherapy/radiation ([facility]) p/w worsening behavioral changes. Patient has symptoms of ashley including mood lability and impulsivity that appear at first encounter out of proportion to her baseline, though she is not "floridly manic" at current presentation, nor is she psychotic.  Patient has been additionally been on longstanding amphetamine at a moderately high dose. Reports no EEG or seizures since procedure in 2015 when R temporal lobe astrocytoma was removed, and subsequently treated w/ chemo/radiation. Patient currently oriented and not delirious. No apparent suicidality.

## 2017-06-14 NOTE — CONSULT NOTE ADULT - SUBJECTIVE AND OBJECTIVE BOX
Neurology Consult    Name  LORRAINE MEI      Subjective  42 year old female with R temporal lobe astrocytoma s/p resection in 2014, 2015 presents with 2 weeks of worsening behavioral changes including loss of inhibition, hypersexuality, and aggression. Has also had mood swings with increasing fatigue as well. These have been going on since her surgery in 2015 but have worsened recently. Patient states that she is aware during the events but afterward, is often in disbelief that she could be capable of such things. She is  with 2 children and worries about how these behaviors will affect her homelife.  Patient attempted to check herself into [facility] voluntarily, however they required medical clearance prior and directed her to the [facility] ER.   Of note , patient states that she had a CVA affecting her L side after the surgery but has regained 95% of her strength back.   Is on topomax 125mg/100mg but has not had a grand mal seizure since being diagnosed with the astrocytoma.  Denies headache or visual changes.                                                                       MEDICATIONS  (STANDING):  FLUoxetine 60milliGRAM(s) Oral daily  ARIPiprazole 30milliGRAM(s) Oral daily  amphetamine/dextroamphetamine 30milliGRAM(s) Oral two times a day  pantoprazole    Tablet 40milliGRAM(s) Oral before breakfast  topiramate 100milliGRAM(s) Oral at bedtime  topiramate 125milliGRAM(s) Oral daily  enoxaparin Injectable 40milliGRAM(s) SubCutaneous daily  lactobacillus acidophilus 1Tablet(s) Oral two times a day with meals    MEDICATIONS  (PRN):  ALPRAZolam 1milliGRAM(s) Oral three times a day PRN Anxiety      Allergies    No Known Drug Allergies  Pencillin (Vomiting)    Intolerances        Objective  Vital Signs Last 24 Hrs  T(C): 36.9, Max: 37 (06-14 @ 01:37)  T(F): 98.4, Max: 98.6 (06-14 @ 01:37)  HR: 88 (77 - 89)  BP: 99/60 (99/60 - 127/80)  BP(mean): --  RR: 18 (16 - 19)  SpO2: 100% (96% - 100%)    General Exam   General appearance: No acute distress, resting in stretcher with family at bedside, well-nourished  Respiratory:      non-labored breathing             Neurological Exam  Mental Status: alert and oriented x3, fluent semi-pressured speech with tangential train of thought, emotional lability when describing behaviors    Cranial Nerves: pupils 3.5mm and reactive, EOMI, visual fields intact, no facial droop, no dysarthria    Motor:   Tone:    normal               Strength: 5/5 upper and lower b/l  Pronator drift:                none  Dysmetria: none  Tremor: none appreciated at rest or in action    Sensation: intact grossly to light touch      Other Studies    06-13    143  |  102  |  13  ----------------------------<  78  3.9   |  20<L>  |  1.06    Ca    8.3<L>      13 Jun 2017 20:00  Phos  5.0     06-13  Mg     2.1     06-13    TPro  7.0  /  Alb  4.5  /  TBili  0.3  /  DBili  x   /  AST  17  /  ALT  11  /  AlkPhos  112  06-13 06-13    143  |  102  |  13  ----------------------------<  78  3.9   |  20<L>  |  1.06    Ca    8.3<L>      13 Jun 2017 20:00  Phos  5.0     06-13  Mg     2.1     06-13    TPro  7.0  /  Alb  4.5  /  TBili  0.3  /  DBili  x   /  AST  17  /  ALT  11  /  AlkPhos  112  06-13    LIVER FUNCTIONS - ( 13 Jun 2017 20:00 )  Alb: 4.5 g/dL / Pro: 7.0 g/dL / ALK PHOS: 112 u/L / ALT: 11 u/L / AST: 17 u/L / GGT: x             Radiology    CTh: Right temporal postoperative cavity with large region of encephalomalacia  and gliosis in the right temporal lobe with associated ex vacuo dilatation  of the right lateral ventricle.

## 2017-06-14 NOTE — H&P ADULT - NEGATIVE NEUROLOGICAL SYMPTOMS
no difficulty walking/no tremors/no transient paralysis/no syncope/no generalized seizures/no weakness/no focal seizures/no loss of consciousness/no paresthesias

## 2017-06-14 NOTE — H&P ADULT - RS GEN PE MLT RESP DETAILS PC
breath sounds equal/respirations non-labored/airway patent/good air movement/normal/clear to auscultation bilaterally clear to auscultation bilaterally/breath sounds equal/respirations non-labored/good air movement/airway patent

## 2017-06-14 NOTE — PROGRESS NOTE ADULT - SUBJECTIVE AND OBJECTIVE BOX
Patient is a 42y old  Female who presents with metabolic enceph of unclear etiology, poss temporal sz      SUBJECTIVE / OVERNIGHT EVENTS: emotionally labile, feeling sad about losing her family; laughing about her new hair do (EEG leads).  "I need help"    MEDICATIONS  (STANDING):  FLUoxetine 60milliGRAM(s) Oral daily  ARIPiprazole 30milliGRAM(s) Oral daily  amphetamine/dextroamphetamine 30milliGRAM(s) Oral two times a day  pantoprazole    Tablet 40milliGRAM(s) Oral before breakfast  topiramate 100milliGRAM(s) Oral at bedtime  topiramate 125milliGRAM(s) Oral daily  enoxaparin Injectable 40milliGRAM(s) SubCutaneous daily  lactobacillus acidophilus 1Tablet(s) Oral two times a day with meals    MEDICATIONS  (PRN):  ALPRAZolam 1milliGRAM(s) Oral three times a day PRN Anxiety      Vital Signs Last 24 Hrs  T(C): 36.9, Max: 37 (06-14 @ 01:37)  HR: 88 (77 - 89)  BP: 99/60 (99/60 - 127/80)  RR: 18 (16 - 19)  SpO2: 100% (96% - 100%)      PHYSICAL EXAM:  GENERAL: NAD, well-developed  HEAD:  Atraumatic, Normocephalic  EYES: EOMI, PERRLA, conjunctiva and sclera clear  CHEST/LUNG: Clear to auscultation bilaterally; No wheeze  HEART: Regular rate and rhythm; No murmurs, rubs, or gallops  ABDOMEN: Soft, Nontender, Nondistended; Bowel sounds present, obese  EXTREMITIES:  No clubbing, cyanosis, or edema  PSYCH: AAOx3, emotionally labile  NEUROLOGY: non-focal      LABS:                        10.8   3.56  )-----------( 151      ( 14 Jun 2017 05:54 )             33.3     06-14    137  |  103  |  7   ----------------------------<  100<H>  4.0   |  21<L>  |  0.91    Ca    9.0      14 Jun 2017 05:54  Phos  3.0     06-14  Mg     2.1     06-14    TPro  7.0  /  Alb  4.5  /  TBili  0.3  /  DBili  x   /  AST  17  /  ALT  11  /  AlkPhos  112  06-13

## 2017-06-14 NOTE — H&P ADULT - NEGATIVE OPHTHALMOLOGIC SYMPTOMS
no blurred vision L/no diplopia/no blurred vision R no blurred vision L/no blurred vision R/no diplopia/no photophobia

## 2017-06-14 NOTE — H&P ADULT - PSH
Cancer of temporal lobe    Fracture of lumbar spine without cord injury  lumbar surgery in 2006  H/O arthroscopy of shoulder  right - 2001 and 2004  H/O colonoscopy  and endoscopy  H/O foot surgery  right foot  x 2 - 11 yrs ago

## 2017-06-14 NOTE — BEHAVIORAL HEALTH ASSESSMENT NOTE - NSBHMEDSOTHERFT_PSY_A_CORE
Prozac 60 mg daily, Abilify 30 mg daily, Adderall 30 mg BID, Xanax 1 mg TID PRN, Topamax 125 mg qam and 100 mg QHS

## 2017-06-15 ENCOUNTER — INPATIENT (INPATIENT)
Facility: HOSPITAL | Age: 42
LOS: 11 days | Discharge: HOME | End: 2017-06-27
Attending: PSYCHIATRY & NEUROLOGY | Admitting: PSYCHIATRY & NEUROLOGY
Payer: COMMERCIAL

## 2017-06-15 VITALS — TEMPERATURE: 98 F

## 2017-06-15 VITALS — SYSTOLIC BLOOD PRESSURE: 100 MMHG | DIASTOLIC BLOOD PRESSURE: 60 MMHG | HEART RATE: 74 BPM

## 2017-06-15 DIAGNOSIS — F31.9 BIPOLAR DISORDER, UNSPECIFIED: ICD-10-CM

## 2017-06-15 DIAGNOSIS — C71.2 MALIGNANT NEOPLASM OF TEMPORAL LOBE: Chronic | ICD-10-CM

## 2017-06-15 PROBLEM — C71.9 MALIGNANT NEOPLASM OF BRAIN, UNSPECIFIED: Chronic | Status: ACTIVE | Noted: 2017-06-13

## 2017-06-15 LAB
BUN SERPL-MCNC: 13 MG/DL — SIGNIFICANT CHANGE UP (ref 7–23)
CALCIUM SERPL-MCNC: 8.4 MG/DL — SIGNIFICANT CHANGE UP (ref 8.4–10.5)
CHLORIDE SERPL-SCNC: 104 MMOL/L — SIGNIFICANT CHANGE UP (ref 98–107)
CO2 SERPL-SCNC: 21 MMOL/L — LOW (ref 22–31)
CREAT SERPL-MCNC: 0.95 MG/DL — SIGNIFICANT CHANGE UP (ref 0.5–1.3)
FOLATE SERPL-MCNC: 4.9 NG/ML — SIGNIFICANT CHANGE UP (ref 4.7–20)
GLUCOSE SERPL-MCNC: 95 MG/DL — SIGNIFICANT CHANGE UP (ref 70–99)
HAV IGM SER-ACNC: NONREACTIVE — SIGNIFICANT CHANGE UP
HBV CORE IGM SER-ACNC: NONREACTIVE — SIGNIFICANT CHANGE UP
HBV SURFACE AG SER-ACNC: NONREACTIVE — SIGNIFICANT CHANGE UP
HCV AB S/CO SERPL IA: 0.12 S/CO — SIGNIFICANT CHANGE UP
HCV AB SERPL-IMP: SIGNIFICANT CHANGE UP
MAGNESIUM SERPL-MCNC: 2 MG/DL — SIGNIFICANT CHANGE UP (ref 1.6–2.6)
PHOSPHATE SERPL-MCNC: 3.1 MG/DL — SIGNIFICANT CHANGE UP (ref 2.5–4.5)
POTASSIUM SERPL-MCNC: 3.8 MMOL/L — SIGNIFICANT CHANGE UP (ref 3.5–5.3)
POTASSIUM SERPL-SCNC: 3.8 MMOL/L — SIGNIFICANT CHANGE UP (ref 3.5–5.3)
SODIUM SERPL-SCNC: 139 MMOL/L — SIGNIFICANT CHANGE UP (ref 135–145)
VIT B12 SERPL-MCNC: 203 PG/ML — SIGNIFICANT CHANGE UP (ref 200–900)

## 2017-06-15 PROCEDURE — 95951: CPT | Mod: 26

## 2017-06-15 PROCEDURE — 99239 HOSP IP/OBS DSCHRG MGMT >30: CPT

## 2017-06-15 PROCEDURE — 99233 SBSQ HOSP IP/OBS HIGH 50: CPT

## 2017-06-15 PROCEDURE — 95957 EEG DIGITAL ANALYSIS: CPT | Mod: 26

## 2017-06-15 RX ORDER — LACTOBACILLUS ACIDOPHILUS 100MM CELL
1 CAPSULE ORAL
Qty: 0 | Refills: 0 | COMMUNITY
Start: 2017-06-15

## 2017-06-15 RX ORDER — TOPIRAMATE 25 MG
5 TABLET ORAL
Qty: 0 | Refills: 0 | COMMUNITY
Start: 2017-06-15

## 2017-06-15 RX ORDER — ARIPIPRAZOLE 15 MG/1
1 TABLET ORAL
Qty: 0 | Refills: 0 | COMMUNITY

## 2017-06-15 RX ORDER — TOPIRAMATE 25 MG
0 TABLET ORAL
Qty: 0 | Refills: 0 | COMMUNITY

## 2017-06-15 RX ORDER — LACTOBACILLUS ACIDOPHILUS 100MM CELL
1 CAPSULE ORAL
Qty: 0 | Refills: 0 | Status: DISCONTINUED | OUTPATIENT
Start: 2017-06-15 | End: 2017-06-27

## 2017-06-15 RX ORDER — DEXTROAMPHETAMINE SACCHARATE, AMPHETAMINE ASPARTATE, DEXTROAMPHETAMINE SULFATE AND AMPHETAMINE SULFATE 1.875; 1.875; 1.875; 1.875 MG/1; MG/1; MG/1; MG/1
1 TABLET ORAL
Qty: 0 | Refills: 0 | COMMUNITY
Start: 2017-06-15

## 2017-06-15 RX ORDER — ALPRAZOLAM 0.25 MG
1 TABLET ORAL
Qty: 0 | Refills: 0 | COMMUNITY

## 2017-06-15 RX ORDER — PANTOPRAZOLE SODIUM 20 MG/1
40 TABLET, DELAYED RELEASE ORAL
Qty: 0 | Refills: 0 | Status: DISCONTINUED | OUTPATIENT
Start: 2017-06-16 | End: 2017-06-27

## 2017-06-15 RX ORDER — FLUOXETINE HCL 10 MG
3 CAPSULE ORAL
Qty: 0 | Refills: 0 | COMMUNITY
Start: 2017-06-15

## 2017-06-15 RX ORDER — TOPIRAMATE 25 MG
100 TABLET ORAL AT BEDTIME
Qty: 0 | Refills: 0 | Status: DISCONTINUED | OUTPATIENT
Start: 2017-06-15 | End: 2017-06-21

## 2017-06-15 RX ORDER — PANTOPRAZOLE SODIUM 20 MG/1
1 TABLET, DELAYED RELEASE ORAL
Qty: 0 | Refills: 0 | COMMUNITY
Start: 2017-06-15

## 2017-06-15 RX ORDER — FLUOXETINE HCL 10 MG
60 CAPSULE ORAL DAILY
Qty: 0 | Refills: 0 | Status: DISCONTINUED | OUTPATIENT
Start: 2017-06-16 | End: 2017-06-16

## 2017-06-15 RX ORDER — TOPIRAMATE 25 MG
125 TABLET ORAL DAILY
Qty: 0 | Refills: 0 | Status: DISCONTINUED | OUTPATIENT
Start: 2017-06-16 | End: 2017-06-21

## 2017-06-15 RX ORDER — ALPRAZOLAM 0.25 MG
1 TABLET ORAL
Qty: 0 | Refills: 0 | COMMUNITY
Start: 2017-06-15

## 2017-06-15 RX ORDER — HALOPERIDOL DECANOATE 100 MG/ML
5 INJECTION INTRAMUSCULAR ONCE
Qty: 0 | Refills: 0 | Status: DISCONTINUED | OUTPATIENT
Start: 2017-06-15 | End: 2017-06-27

## 2017-06-15 RX ORDER — ESOMEPRAZOLE MAGNESIUM 40 MG/1
1 CAPSULE, DELAYED RELEASE ORAL
Qty: 0 | Refills: 0 | COMMUNITY

## 2017-06-15 RX ORDER — ALPRAZOLAM 0.25 MG
1 TABLET ORAL THREE TIMES A DAY
Qty: 0 | Refills: 0 | Status: DISCONTINUED | OUTPATIENT
Start: 2017-06-15 | End: 2017-06-19

## 2017-06-15 RX ORDER — FLUOXETINE HCL 10 MG
1 CAPSULE ORAL
Qty: 0 | Refills: 0 | COMMUNITY

## 2017-06-15 RX ORDER — TOPIRAMATE 25 MG
1 TABLET ORAL
Qty: 0 | Refills: 0 | COMMUNITY
Start: 2017-06-15

## 2017-06-15 RX ORDER — ALPRAZOLAM 0.25 MG
1 TABLET ORAL ONCE
Qty: 0 | Refills: 0 | Status: DISCONTINUED | OUTPATIENT
Start: 2017-06-15 | End: 2017-06-15

## 2017-06-15 RX ORDER — ARIPIPRAZOLE 15 MG/1
1 TABLET ORAL
Qty: 0 | Refills: 0 | COMMUNITY
Start: 2017-06-15

## 2017-06-15 RX ORDER — DEXTROAMPHETAMINE SACCHARATE, AMPHETAMINE ASPARTATE, DEXTROAMPHETAMINE SULFATE AND AMPHETAMINE SULFATE 1.875; 1.875; 1.875; 1.875 MG/1; MG/1; MG/1; MG/1
1 TABLET ORAL
Qty: 0 | Refills: 0 | COMMUNITY

## 2017-06-15 RX ORDER — DEXTROAMPHETAMINE SACCHARATE, AMPHETAMINE ASPARTATE, DEXTROAMPHETAMINE SULFATE AND AMPHETAMINE SULFATE 1.875; 1.875; 1.875; 1.875 MG/1; MG/1; MG/1; MG/1
15 TABLET ORAL
Qty: 0 | Refills: 0 | Status: DISCONTINUED | OUTPATIENT
Start: 2017-06-15 | End: 2017-06-16

## 2017-06-15 RX ORDER — ARIPIPRAZOLE 15 MG/1
30 TABLET ORAL DAILY
Qty: 0 | Refills: 0 | Status: DISCONTINUED | OUTPATIENT
Start: 2017-06-16 | End: 2017-06-27

## 2017-06-15 RX ADMIN — PANTOPRAZOLE SODIUM 40 MILLIGRAM(S): 20 TABLET, DELAYED RELEASE ORAL at 05:15

## 2017-06-15 RX ADMIN — ENOXAPARIN SODIUM 40 MILLIGRAM(S): 100 INJECTION SUBCUTANEOUS at 11:45

## 2017-06-15 RX ADMIN — Medication 600 MILLIGRAM(S): at 09:03

## 2017-06-15 RX ADMIN — DEXTROAMPHETAMINE SACCHARATE, AMPHETAMINE ASPARTATE, DEXTROAMPHETAMINE SULFATE AND AMPHETAMINE SULFATE 15 MILLIGRAM(S): 1.875; 1.875; 1.875; 1.875 TABLET ORAL at 18:49

## 2017-06-15 RX ADMIN — Medication 100 MILLIGRAM(S): at 22:09

## 2017-06-15 RX ADMIN — Medication 1 MILLIGRAM(S): at 11:45

## 2017-06-15 RX ADMIN — Medication 600 MILLIGRAM(S): at 10:03

## 2017-06-15 RX ADMIN — Medication 1 TABLET(S): at 09:03

## 2017-06-15 RX ADMIN — ARIPIPRAZOLE 30 MILLIGRAM(S): 15 TABLET ORAL at 11:45

## 2017-06-15 RX ADMIN — Medication 1 MILLIGRAM(S): at 22:09

## 2017-06-15 RX ADMIN — Medication 1 MILLIGRAM(S): at 18:49

## 2017-06-15 RX ADMIN — Medication 125 MILLIGRAM(S): at 11:45

## 2017-06-15 RX ADMIN — Medication 1 TABLET(S): at 17:49

## 2017-06-15 RX ADMIN — Medication 60 MILLIGRAM(S): at 11:45

## 2017-06-15 RX ADMIN — DEXTROAMPHETAMINE SACCHARATE, AMPHETAMINE ASPARTATE, DEXTROAMPHETAMINE SULFATE AND AMPHETAMINE SULFATE 15 MILLIGRAM(S): 1.875; 1.875; 1.875; 1.875 TABLET ORAL at 05:16

## 2017-06-15 NOTE — DISCHARGE NOTE ADULT - MEDICATION SUMMARY - MEDICATIONS TO TAKE
I will START or STAY ON the medications listed below when I get home from the hospital:    topiramate 25 mg oral tablet  -- 5 tab(s) by mouth once a day  -- Indication: For Behavior concern in adult    topiramate 100 mg oral tablet  -- 1 tab(s) by mouth once a day (at bedtime)  -- Indication: For Behavior concern in adult    FLUoxetine 20 mg oral capsule  -- 3 cap(s) by mouth once a day  -- Indication: For Mood disorder    ARIPiprazole 30 mg oral tablet  -- 1 tab(s) by mouth once a day  -- Indication: For Mood disorder    ALPRAZolam 1 mg oral tablet  -- 1 tab(s) by mouth 3 times a day, As needed, Anxiety  -- Indication: For Mood disorder    amphetamine-dextroamphetamine 15 mg oral tablet  -- 1 tab(s) by mouth 2 times a day  -- Indication: For Mood disorder    lactobacillus acidophilus oral capsule  -- 1 cap(s) by mouth 2 times a day  -- Indication: For supplement    pantoprazole 40 mg oral delayed release tablet  -- 1 tab(s) by mouth once a day (before a meal)  -- Indication: For GERD (gastroesophageal reflux disease)

## 2017-06-15 NOTE — DISCHARGE NOTE ADULT - MEDICATION SUMMARY - MEDICATIONS TO CHANGE
I will SWITCH the dose or number of times a day I take the medications listed below when I get home from the hospital:    Adderall 30 mg oral tablet  -- 1 tab(s) by mouth 2 times a day

## 2017-06-15 NOTE — DISCHARGE NOTE ADULT - CARE PLAN
Principal Discharge DX:	Metabolic encephalopathy  Goal:	stable  Instructions for follow-up, activity and diet:	Follow up with providers at [facility].  Evaluated by neurology - history of temporal lobe astrocytoma s/p resection presents with bizzare behavior. EEG and ct head negative. Behavior not due to seizures or astrocytoma. Diet and activity as tolerated. Please continue taking all medications as directed. Your Adderall was decreased.  Secondary Diagnosis:	Astrocytoma  Goal:	stable  Secondary Diagnosis:	Cannabis abuse  Goal:	stable - avoid use of illicit drugs  Secondary Diagnosis:	Mood disorder  Goal:	stable  Instructions for follow-up, activity and diet:	Please continue taking all home medications as directed.  Secondary Diagnosis:	Hypothyroidism, unspecified type  Goal:	stable  Secondary Diagnosis:	Gastroesophageal reflux disease without esophagitis  Goal:	stable Principal Discharge DX:	Metabolic encephalopathy  Goal:	stable  Instructions for follow-up, activity and diet:	Follow up with providers at [facility].  Evaluated by neurology - history of temporal lobe astrocytoma s/p resection presents with bizzare behavior. EEG and ct head negative. Behavior not due to seizures or astrocytoma. Diet and activity as tolerated. Please continue taking all medications as directed. Your Adderall was decreased.  Secondary Diagnosis:	Astrocytoma  Goal:	stable  Secondary Diagnosis:	Cannabis abuse  Goal:	stable - avoid use of illicit drugs  Secondary Diagnosis:	Mood disorder  Goal:	stable  Instructions for follow-up, activity and diet:	Please continue taking all home medications as directed.  Secondary Diagnosis:	Hypothyroidism, unspecified type  Goal:	stable  Secondary Diagnosis:	Gastroesophageal reflux disease without esophagitis  Goal:	stable

## 2017-06-15 NOTE — CHART NOTE - NSCHARTNOTEFT_GEN_A_CORE
Pt a/w new personality changes with ho astrocytoma with resection and chemo.  Medical w/u neg, no evidence of temporal sz  Pt medically stable to tx to [facility]    35 min spent with d/c planning

## 2017-06-15 NOTE — EEG REPORT - NS EEG TEXT BOX
6/14-6/15/2017    Indication: Concern for seizures    FINDINGS:  The background was continuous, spontaneously variable and reactive.  During wakefulness, the posteriorly dominant rhythm consisted of 7-8 Hz activity, with an amplitude to 30 uV, that attenuated to eye opening.  There is continuous irregular generalized theta and delta activity, more prominent over the right temporal region with higher amplitude and overriding faster frequency activity (breach effect).       Sleep Background:  Drowsiness was characterized by fragmentation, attenuation, and slowing of the background activity.    Sleep was characterized by the presence of vertex waves, symmetric spindles, and K-complexes.    Epileptiform Activity:   No epileptiform discharges were present.    Events:  No clinical events were recorded.  No seizures were recorded.    Activation Procedures:   -Hyperventilation was not performed.    -Photic stimulation was not performed.    Artifacts:  Intermittent myogenic and movement artifacts were noted.    ECG:  The heart rate on single channel ECG was predominantly between 70-80 BPM.    Compressed Spectral Array Digital Analysis    FINDINGS:  Compressed Spectral Array (CSA) data was reviewed separately and correlated with the electroencephalographic findings detailed above.  CSA showed a variable spectral pattern.  Areas of increased power in particular were reviewed in detail, and compared with the raw EEG data.  Areas of abrupt increases in spectral power were reviewed to exclude seizures, and were determined to be artifactual in nature.    The relative ratio of the power of delta range frequencies and faster frequencies remained stable over the course of the study.  There was no definitive increase in the relative power in the delta frequency spectrum apparent in the left hemisphere versus the right hemisphere.      Compressed Spectral Array (Digital Analysis) Summary/ Impression:  No persistent hemispheric asymmetry.  Intermittent areas of increased power reviewed, without definite epileptiform activity associated on CSA.      EEG Classification / Summary:  Abnormal Routine EEG Study   -moderate generalized slowing, more prominent over right temporal region  - higher amplitude and fast frequency activity, right (breach effect)    Clinical Impression:  Findings indicate moderate diffuse or multifocal cerebral dysfunction, worse in right temporal region. Breach effect indicative of local skull defect. There were no epileptiform abnormalities recorded.

## 2017-06-15 NOTE — PROGRESS NOTE ADULT - ASSESSMENT
41 yo female with history of temproal lobe astrocytoma s/p resection presents with bizzare behavior. EEG and ct head negative. Behavior not due to seizures or astrocytoma

## 2017-06-15 NOTE — DISCHARGE NOTE ADULT - PATIENT PORTAL LINK FT
“You can access the [facility] Patient Portal, offered by [facility], by registering with the following website: [email or web address]”

## 2017-06-15 NOTE — PROGRESS NOTE ADULT - SUBJECTIVE AND OBJECTIVE BOX
Subjective:Interval History - No events overnight    Objective:   Vital Signs Last 24 Hrs  T(C): 36.6, Max: 37.3 (06-14 @ 14:02)  T(F): 97.9, Max: 99.1 (06-14 @ 14:02)  HR: 80 (78 - 104)  BP: 104/58 (88/57 - 104/58)  BP(mean): --  RR: 18 (18 - 18)  SpO2: 100% (98% - 100%)    General Exam:   General appearance: No acute distress                   Neurological Exam:  Mental Status: Orientated to self, date and place.  Attention intact.  No dysarthria, aphasia or neglect.  Knowledge intact.  tangential speech     Cranial Nerves: CN I - not tested.  PERRL, EOMI, VFF, no nystagmus or diplopia.  No APD.  Fundi not visualized bilaterally.  CN V1-3 intact to light touch and pinprick.  No facial asymmetry.  Hearing intact to finger rub bilaterally.  Tongue, uvula and palate midline.  Sternocleidomastoid and Trapezius intact bilaterally.    Motor:   Tone: normal.                  Strength: intact throughout  Pronator drift: none                 Dysmeria: None to finger-nose-finger or heel-shin-heel  No truncal ataxia.    Tremor: No resting, postural or action tremor.  No myoclonus.    Sensation: intact to light touch, pinprick, vibration and proprioception    Deep Tendon Reflexes: 1+ bilateral biceps, triceps, brachioradialis, knee and ankle  Toes flexor bilaterally    Gait: normal and stable.      Other:    06-15    139  |  104  |  13  ----------------------------<  95  3.8   |  21<L>  |  0.95    Ca    8.4      15 Victoriano 2017 05:10  Phos  3.1     06-15  Mg     2.0     06-15    TPro  7.0  /  Alb  4.5  /  TBili  0.3  /  DBili  x   /  AST  17  /  ALT  11  /  AlkPhos  112  06-13    LIVER FUNCTIONS - ( 13 Jun 2017 20:00 )  Alb: 4.5 g/dL / Pro: 7.0 g/dL / ALK PHOS: 112 u/L / ALT: 11 u/L / AST: 17 u/L / GGT: x                                 10.8   3.56  )-----------( 151      ( 14 Jun 2017 05:54 )             33.3     CT head  Postoperative changes in the right temporal lobe. No acute intracranial   hemorrhage or mass effect. If the patient has new and persistent   symptoms, consider short interval follow-up head CT or brain MRI   follow-up if there are no MRI contraindications.    VIDEO EEG  No seizures    MEDICATIONS  (STANDING):  FLUoxetine 60milliGRAM(s) Oral daily  ARIPiprazole 30milliGRAM(s) Oral daily  pantoprazole    Tablet 40milliGRAM(s) Oral before breakfast  topiramate 100milliGRAM(s) Oral at bedtime  topiramate 125milliGRAM(s) Oral daily  enoxaparin Injectable 40milliGRAM(s) SubCutaneous daily  lactobacillus acidophilus 1Tablet(s) Oral two times a day with meals  amphetamine/dextroamphetamine 15milliGRAM(s) Oral two times a day    MEDICATIONS  (PRN):  ALPRAZolam 1milliGRAM(s) Oral three times a day PRN Anxiety  ibuprofen  Tablet 600milliGRAM(s) Oral every 6 hours PRN headache

## 2017-06-15 NOTE — PROGRESS NOTE BEHAVIORAL HEALTH - SUMMARY
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 Patient has symptoms of ashley including mood lability and impulsivity that appear at first encounter out of proportion to her baseline, though she is not "floridly manic" at current presentation, nor is she psychotic.  Patient has been additionally been on longstanding amphetamine at a moderately high dose. Reports no EEG or seizures since procedure in 2015 when R temporal lobe astrocytoma was removed, and subsequently treated w/ chemo/radiation. Patient currently oriented and not delirious. No apparent suicidality. 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 Patient has symptoms of ashley including mood lability and impulsivity that appear out of proportion to her baseline, though she is not "floridly manic" at current presentation, nor is she psychotic.  Patient has been additionally been on longstanding amphetamine at a moderately high dose. Reports no EEG or seizures since procedure in 2015 when R temporal lobe astrocytoma was removed, and subsequently treated w/ chemo/radiation. Patient currently oriented and not delirious. No apparent suicidality.     24 vEEG here negative and medically/neurologically cleared to go to [place]. She has regular imaging/oncology follow up at [facility] for monitoring of astrocytoma, no longer receiving routine treatment.      Please continue current med regimen Abilify, Prozac, Topomax as written on med discharge. Plan is to taper/stop Adderall and Xanax completely. Primary [place] team to provide new anxiolytic agent.  Consider switching Topomax to VPA for both AED and mood stabilization properties.  Consider consult to [facility] Dr. Rangel for this possible switch.

## 2017-06-15 NOTE — PROGRESS NOTE BEHAVIORAL HEALTH - NSBHCHARTREVIEWVS_PSY_A_CORE FT
Vital Signs Last 24 Hrs  T(C): 36.6, Max: 37.3 (06-14 @ 14:02)  T(F): 97.9, Max: 99.1 (06-14 @ 14:02)  HR: 80 (78 - 104)  BP: 104/58 (88/57 - 104/58)  BP(mean): --  RR: 18 (18 - 18)  SpO2: 100% (98% - 100%)

## 2017-06-15 NOTE — DISCHARGE NOTE ADULT - PLAN OF CARE
stable Follow up with providers at [facility].  Evaluated by neurology - history of temporal lobe astrocytoma s/p resection presents with bizzare behavior. EEG and ct head negative. Behavior not due to seizures or astrocytoma. Diet and activity as tolerated. Please continue taking all medications as directed. Your Adderall was decreased. stable - avoid use of illicit drugs Please continue taking all home medications as directed.

## 2017-06-15 NOTE — PROGRESS NOTE BEHAVIORAL HEALTH - NSBHCHARTREVIEWLAB_PSY_A_CORE FT
10.8   3.56  )-----------( 151      ( 2017 05:54 )             33.3     06-15    139  |  104  |  13  ----------------------------<  95  3.8   |  21<L>  |  0.95    Ca    8.4      15 Victoriano 2017 05:10  Phos  3.1     06-15  Mg     2.0     06-15    TPro  7.0  /  Alb  4.5  /  TBili  0.3  /  DBili  x   /  AST  17  /  ALT  11  /  AlkPhos  112  06-13    LIVER FUNCTIONS - ( 2017 20:00 )  Alb: 4.5 g/dL / Pro: 7.0 g/dL / ALK PHOS: 112 u/L / ALT: 11 u/L / AST: 17 u/L / GGT: x             Urinalysis Basic - ( 2017 20:00 )    Color: YELLOW / Appearance: CLEAR / S.015 / pH: 5.5  Gluc: NEGATIVE / Ketone: NEGATIVE  / Bili: NEGATIVE / Urobili: NORMAL E.U.   Blood: NEGATIVE / Protein: 10 / Nitrite: NEGATIVE   Leuk Esterase: NEGATIVE / RBC: 0-2 / WBC 2-5   Sq Epi: OCC / Non Sq Epi: x / Bacteria: x

## 2017-06-15 NOTE — PROGRESS NOTE BEHAVIORAL HEALTH - CASE SUMMARY
Pt seen today in follow-up, chart reviewed. Xanax prn dose x 1 overnight for anxiety. 24 hour vEEG negative for epileptiform activity but shows moderate generalized slowing most prominent in the R temporal region. Pt remains labile on today's exam, denies any SI/HI, feels remorseful about her recently relationships with multiple men. Plan as above- as pt is medically cleared, pt will be transferred to [facility] on voluntary 9.13 status for further psychiatric stabilization and treatment. Transfer on current psychotropic medication regimen, with plan to fully taper off of Adderall and Xanax hopefully while at [facility].

## 2017-06-15 NOTE — DISCHARGE NOTE ADULT - HOSPITAL COURSE
Patient is a 41 yo F w/ history of astrocytoma (R temporal lobe) s/p adjuvant chemo/RT and resection x2 c/b perioperative CVA (2015) presents with worsening behavioral changes, initially admitted to [facility] earlier today but was transferred to [facility] ED for medical clearance.    Altered mental status.    - neuro consulted   - 24 hour EEG - no seizures  - TSH/free T4 WNL, B12 WNL, Folate WNL, HIV negative, RPR negative  - c/w home Xanax, Abilify, Prozac, Topamax  - adderal decreased  - f/u Psych recs  - QTc - 458    Anxiety.  Plan: - c/w home meds.     GERD (gastroesophageal reflux disease).  Plan: - c/w Protonix.     Need for prophylactic measure.    - Lovenox.     Stable for D/C to [facility]

## 2017-06-15 NOTE — PROGRESS NOTE BEHAVIORAL HEALTH - NSBHFUPINTERVALHXFT_PSY_A_CORE
Pt took PRN Xanax 1 mg @09:25 and @21:52 yesterday for anxiety. EEG yesterday with moderate generalized slowing, more so in right temporal region, no epileptiform activity. Pt took PRN Xanax 1 mg @09:25 and @21:52 yesterday for anxiety. EEG yesterday with moderate generalized slowing, more so in right temporal region, no epileptiform activity. Upon presentation patient again is labile going from jovial to tearful, at times prolonged laughter that seems out of context to situation. She feels guilty that she is falling in love with so many men, especially most recently one that scammed her. She states this is a huge shift from before her surgery and feels sense of loss of control. She endorses fair sleep and appetite, compliant with meds, wishing to sign voluntary to [place]. No discernable psychotic symptoms, no suicidality.

## 2017-06-15 NOTE — CHART NOTE - NSCHARTNOTEFT_GEN_A_CORE
Patient seen and examined at bedside.  NAD, VSS, A+O x4, in good spirits today.  vEEG without any epileptiform abnormalities noted.  S/W neurology, no further workup needed, no need to start AED at this time, symptoms likely 2/2 underlying psychiatric issue, no contraindication to transfer to [facility] at this time.

## 2017-06-15 NOTE — PROGRESS NOTE BEHAVIORAL HEALTH - NSBHCHARTREVIEWIMAGING_PSY_A_CORE FT
EEG Classification / Summary:  Abnormal Routine EEG Study   -moderate generalized slowing, more prominent over right temporal region  - higher amplitude and fast frequency activity, right (breach effect)    Clinical Impression:  Findings indicate moderate diffuse or multifocal cerebral dysfunction, worse in right temporal region. Breach effect indicative of local skull defect. There were no epileptiform abnormalities recorded.          EXAM:  CT BRAIN        PROCEDURE DATE:  Jun 13 2017         INTERPRETATION:  HISTORY: Altered mental status, brain cancer status post   tumor removal. History of stroke.    EXAM: A noncontrast head CT was performed. 5 mm axial images were   performed from the skull base to the vertex. Coronal and sagittal   reformations were provided.    COMPARISON: None.    FINDINGS:  Status post right pterional craniectomy, right parietal craniotomy and   mesh cranioplasty.    Right temporal postoperative cavity with large region of encephalomalacia   and gliosis in the right temporal lobe with associated ex vacuo   dilatation of the right lateral ventricle.    No acute intracranial hemorrhage, mass effect, or midline shift. No CT   evidence of acute territorial infarct.    The visualized portions of the paranasal sinuses and mastoid air cells   are clear.    IMPRESSION:    Postoperative changes in the right temporal lobe. No acute intracranial   hemorrhage or mass effect. If the patient has new and persistent   symptoms, consider short interval follow-up head CT or brain MRI   follow-up if there are no MRI contraindications.

## 2017-06-15 NOTE — PROGRESS NOTE ADULT - PROBLEM SELECTOR PLAN 1
Neurology workup negative  Followup at [facility] as outpatient for further monitoring of tumor

## 2017-06-15 NOTE — DISCHARGE NOTE ADULT - SECONDARY DIAGNOSIS.
Astrocytoma Cannabis abuse Mood disorder Hypothyroidism, unspecified type Gastroesophageal reflux disease without esophagitis

## 2017-06-15 NOTE — DISCHARGE NOTE ADULT - PROVIDER TOKENS
FREE:[LAST:[judie],PHONE:[(   )    -],FAX:[(   )    -],ADDRESS:[Brittney Estes MD in [place] [phone]]]

## 2017-06-16 DIAGNOSIS — R73.03 PREDIABETES: ICD-10-CM

## 2017-06-16 DIAGNOSIS — C71.9 MALIGNANT NEOPLASM OF BRAIN, UNSPECIFIED: ICD-10-CM

## 2017-06-16 DIAGNOSIS — F39 UNSPECIFIED MOOD [AFFECTIVE] DISORDER: ICD-10-CM

## 2017-06-16 DIAGNOSIS — E03.9 HYPOTHYROIDISM, UNSPECIFIED: ICD-10-CM

## 2017-06-16 PROCEDURE — 99223 1ST HOSP IP/OBS HIGH 75: CPT

## 2017-06-16 PROCEDURE — 99232 SBSQ HOSP IP/OBS MODERATE 35: CPT

## 2017-06-16 RX ORDER — DEXTROAMPHETAMINE SACCHARATE, AMPHETAMINE ASPARTATE, DEXTROAMPHETAMINE SULFATE AND AMPHETAMINE SULFATE 1.875; 1.875; 1.875; 1.875 MG/1; MG/1; MG/1; MG/1
10 TABLET ORAL
Qty: 0 | Refills: 0 | Status: DISCONTINUED | OUTPATIENT
Start: 2017-06-16 | End: 2017-06-19

## 2017-06-16 RX ORDER — ACETAMINOPHEN/CAFFEINE 500MG-65MG
1 TABLET ORAL ONCE
Qty: 0 | Refills: 0 | Status: COMPLETED | OUTPATIENT
Start: 2017-06-16 | End: 2017-06-16

## 2017-06-16 RX ORDER — FLUOXETINE HCL 10 MG
40 CAPSULE ORAL DAILY
Qty: 0 | Refills: 0 | Status: DISCONTINUED | OUTPATIENT
Start: 2017-06-17 | End: 2017-06-27

## 2017-06-16 RX ORDER — ATORVASTATIN CALCIUM 80 MG/1
10 TABLET, FILM COATED ORAL AT BEDTIME
Qty: 0 | Refills: 0 | Status: DISCONTINUED | OUTPATIENT
Start: 2017-06-16 | End: 2017-06-27

## 2017-06-16 RX ADMIN — Medication 1 MILLIGRAM(S): at 09:26

## 2017-06-16 RX ADMIN — Medication 1 MILLIGRAM(S): at 21:08

## 2017-06-16 RX ADMIN — Medication 1 TABLET(S): at 18:07

## 2017-06-16 RX ADMIN — Medication 1 MILLIGRAM(S): at 13:15

## 2017-06-16 RX ADMIN — ARIPIPRAZOLE 30 MILLIGRAM(S): 15 TABLET ORAL at 09:26

## 2017-06-16 RX ADMIN — Medication 60 MILLIGRAM(S): at 09:26

## 2017-06-16 RX ADMIN — Medication 125 MILLIGRAM(S): at 09:26

## 2017-06-16 RX ADMIN — Medication 100 MILLIGRAM(S): at 21:09

## 2017-06-16 RX ADMIN — Medication 1 TABLET(S): at 09:26

## 2017-06-16 RX ADMIN — DEXTROAMPHETAMINE SACCHARATE, AMPHETAMINE ASPARTATE, DEXTROAMPHETAMINE SULFATE AND AMPHETAMINE SULFATE 15 MILLIGRAM(S): 1.875; 1.875; 1.875; 1.875 TABLET ORAL at 09:26

## 2017-06-16 RX ADMIN — ATORVASTATIN CALCIUM 10 MILLIGRAM(S): 80 TABLET, FILM COATED ORAL at 21:09

## 2017-06-16 RX ADMIN — PANTOPRAZOLE SODIUM 40 MILLIGRAM(S): 20 TABLET, DELAYED RELEASE ORAL at 09:26

## 2017-06-16 RX ADMIN — DEXTROAMPHETAMINE SACCHARATE, AMPHETAMINE ASPARTATE, DEXTROAMPHETAMINE SULFATE AND AMPHETAMINE SULFATE 10 MILLIGRAM(S): 1.875; 1.875; 1.875; 1.875 TABLET ORAL at 17:10

## 2017-06-16 RX ADMIN — Medication 1 TABLET(S): at 17:11

## 2017-06-16 NOTE — CONSULT NOTE ADULT - ASSESSMENT
43 yo F w/ history of astrocytoma (R temporal lobe) s/p adjuvant chemo/RT and resection x2 c/b perioperative CVA (2015) presents with worsening behavioral changes

## 2017-06-16 NOTE — CONSULT NOTE ADULT - SUBJECTIVE AND OBJECTIVE BOX
HPI: 43 yo F w/ history of astrocytoma (R temporal lobe) s/p adjuvant chemo/RT and resection x2 c/b perioperative CVA (2015) presents with worsening behavioral changes, now at [facility] for further management. Patient was admitted at [facility] for medical clearance prior to transfer to [facility]. She was seen by neurology team. Medical workup was negative and she was discharged to [facility]. Today she complains of mild headache. She denies any vision changes, weakness or stiff neck. States that her headache is likely from caffeine withdrawal as she did not drink any coffee today. She denies any chest pain, SOB, fevers, chills. She c/o hair loss and weight gain recently and is concerned about her thyroid. She said she also wants to be checked for diabetes as she gets headaches when she skips her meals. Besides headache she denies any other concerns today.      PAST MEDICAL & SURGICAL HISTORY:  Astrocytoma: Temporal lobe  Chronic sinusitis  Obesity (BMI 30-39.9)  Dislocation of shoulder: x 4 -  2000 to 2005  Spine fracture: lumbar 2005  Hypothyroidism  Anxiety  GERD (gastroesophageal reflux disease)  Asthma: never intubated or hospitalized for ot, uses symbicort and proventil  Cancer of temporal lobe  H/O foot surgery: right foot  x 2 - 11 yrs ago  Fracture of lumbar spine without cord injury: lumbar surgery in 2006  H/O arthroscopy of shoulder: right - 2001 and 2004  H/O colonoscopy: and endoscopy      Review of Systems:   CONSTITUTIONAL: No fever, weight loss, or fatigue  EYES: No eye pain, visual disturbances, or discharge  ENMT:  No difficulty hearing, tinnitus, vertigo;   NECK: No pain or stiffness  RESPIRATORY: No cough, wheezing,   CARDIOVASCULAR: No chest pain, palpitations, dizziness, or leg swelling  GASTROINTESTINAL: No abdominal or epigastric pain.   GENITOURINARY: No dysuria,   NEUROLOGICAL: +headache   SKIN: No rash  ENDOCRINE: + hair loss + weight gain  MUSCULOSKELETAL: No joint pain or swelling;   Psych: states she has compulsions     Allergies    latex (Rash)  No Known Drug Allergies  Pencillin (Vomiting)    Social History: Lives with her  and 2 kids. Occasional cigarette smoking. daily marijuana use.     FAMILY HISTORY:  Father - Dm2      MEDICATIONS  (STANDING):  ALPRAZolam 1milliGRAM(s) Oral three times a day  ARIPiprazole 30milliGRAM(s) Oral daily  lactobacillus acidophilus 1Tablet(s) Oral two times a day with meals  pantoprazole    Tablet 40milliGRAM(s) Oral before breakfast  topiramate 100milliGRAM(s) Oral at bedtime  topiramate 125milliGRAM(s) Oral daily  amphetamine/dextroamphetamine 10milliGRAM(s) Oral <User Schedule>  acetaminophen 500 mG/caffeine 65 mG 1Tablet(s) Oral once    MEDICATIONS  (PRN):  LORazepam   Injectable 2milliGRAM(s) IntraMuscular once PRN severe anxiety, agitation, aggression  LORazepam     Tablet 1milliGRAM(s) Oral every 6 hours PRN anxiety, restlessness, combative behaviors.  haloperidol    Injectable 5milliGRAM(s) IntraMuscular once PRN severe agitation, disorganization      Vital Signs Last 24 Hrs  T(C): 36.9, Max: 36.9 (06-15 @ 20:52)  HR: --85  BP: --115/65  RR: 18 (18 - 18)  SpO2: --  Wt(kg): --  CAPILLARY BLOOD GLUCOSE        PHYSICAL EXAM:  GENERAL: NAD, well-developed  HEAD:  Atraumatic, Normocephalic  EYES: EOMI, PERRLA, conjunctiva and sclera clear  NECK: Supple, No JVD  CHEST/LUNG: Clear to auscultation bilaterally; No wheeze  HEART: Regular rate and rhythm; No murmurs, rubs, or gallops  ABDOMEN: Soft, Nontender, Nondistended; Bowel sounds present  EXTREMITIES:  2+ Peripheral Pulses, No clubbing, cyanosis, or edema  PSYCH: AAOx3  NEUROLOGY: non-focal  SKIN: No rashes or lesions    LABS:    06-15    139  |  104  |  13  ----------------------------<  95  3.8   |  21<L>  |  0.95    Ca    8.4      15 Victoriano 2017 05:10  Phos  3.1     06-15  Mg     2.0     06-15        EKG(personally reviewed): NSR 82bpm . No St changes    RADIOLOGY & ADDITIONAL TESTS: CT head: Postoperative changes in the right temporal lobe. No acute intracranial   hemorrhage or mass effect    Consultant(s) Notes Reviewed:  Neurology consult reviewed from [facility].

## 2017-06-16 NOTE — CONSULT NOTE ADULT - PROBLEM SELECTOR RECOMMENDATION 9
-CT head from [facility] stable  -Per neurology workup, symptoms unlikely to cause from astrocytoma  -Medical workup unremarkable   -outpt folow up with neuro-oncologist. -CT head from [facility] stable  -Per neurology workup, symptoms unlikely to cause from astrocytoma  -Medical workup unremarkable   -outpt folow up with neuro-oncologist.  -Headache today without any red flag signs - no vertigo, no vision changes. Likely caffeine withdrawal headache. Recommend Excedrin x1 dose.

## 2017-06-17 PROCEDURE — 99232 SBSQ HOSP IP/OBS MODERATE 35: CPT

## 2017-06-17 RX ADMIN — Medication 1 MILLIGRAM(S): at 12:55

## 2017-06-17 RX ADMIN — ATORVASTATIN CALCIUM 10 MILLIGRAM(S): 80 TABLET, FILM COATED ORAL at 20:37

## 2017-06-17 RX ADMIN — Medication 125 MILLIGRAM(S): at 09:08

## 2017-06-17 RX ADMIN — Medication 1 TABLET(S): at 08:30

## 2017-06-17 RX ADMIN — Medication 1 MILLIGRAM(S): at 09:08

## 2017-06-17 RX ADMIN — Medication 40 MILLIGRAM(S): at 09:08

## 2017-06-17 RX ADMIN — ARIPIPRAZOLE 30 MILLIGRAM(S): 15 TABLET ORAL at 09:08

## 2017-06-17 RX ADMIN — DEXTROAMPHETAMINE SACCHARATE, AMPHETAMINE ASPARTATE, DEXTROAMPHETAMINE SULFATE AND AMPHETAMINE SULFATE 10 MILLIGRAM(S): 1.875; 1.875; 1.875; 1.875 TABLET ORAL at 14:00

## 2017-06-17 RX ADMIN — Medication 1 MILLIGRAM(S): at 20:37

## 2017-06-17 RX ADMIN — Medication 100 MILLIGRAM(S): at 20:37

## 2017-06-17 RX ADMIN — DEXTROAMPHETAMINE SACCHARATE, AMPHETAMINE ASPARTATE, DEXTROAMPHETAMINE SULFATE AND AMPHETAMINE SULFATE 10 MILLIGRAM(S): 1.875; 1.875; 1.875; 1.875 TABLET ORAL at 08:30

## 2017-06-17 RX ADMIN — PANTOPRAZOLE SODIUM 40 MILLIGRAM(S): 20 TABLET, DELAYED RELEASE ORAL at 08:30

## 2017-06-17 RX ADMIN — Medication 1 TABLET(S): at 16:46

## 2017-06-18 PROCEDURE — 99232 SBSQ HOSP IP/OBS MODERATE 35: CPT

## 2017-06-18 RX ORDER — SENNA PLUS 8.6 MG/1
1 TABLET ORAL EVERY 6 HOURS
Qty: 0 | Refills: 0 | Status: DISCONTINUED | OUTPATIENT
Start: 2017-06-18 | End: 2017-06-27

## 2017-06-18 RX ORDER — DOCUSATE SODIUM 100 MG
100 CAPSULE ORAL
Qty: 0 | Refills: 0 | Status: DISCONTINUED | OUTPATIENT
Start: 2017-06-18 | End: 2017-06-27

## 2017-06-18 RX ADMIN — Medication 125 MILLIGRAM(S): at 08:43

## 2017-06-18 RX ADMIN — Medication 100 MILLIGRAM(S): at 21:18

## 2017-06-18 RX ADMIN — ATORVASTATIN CALCIUM 10 MILLIGRAM(S): 80 TABLET, FILM COATED ORAL at 21:18

## 2017-06-18 RX ADMIN — ARIPIPRAZOLE 30 MILLIGRAM(S): 15 TABLET ORAL at 08:43

## 2017-06-18 RX ADMIN — Medication 100 MILLIGRAM(S): at 16:36

## 2017-06-18 RX ADMIN — SENNA PLUS 1 TABLET(S): 8.6 TABLET ORAL at 16:36

## 2017-06-18 RX ADMIN — Medication 40 MILLIGRAM(S): at 08:43

## 2017-06-18 RX ADMIN — DEXTROAMPHETAMINE SACCHARATE, AMPHETAMINE ASPARTATE, DEXTROAMPHETAMINE SULFATE AND AMPHETAMINE SULFATE 10 MILLIGRAM(S): 1.875; 1.875; 1.875; 1.875 TABLET ORAL at 14:40

## 2017-06-18 RX ADMIN — Medication 1 MILLIGRAM(S): at 08:43

## 2017-06-18 RX ADMIN — DEXTROAMPHETAMINE SACCHARATE, AMPHETAMINE ASPARTATE, DEXTROAMPHETAMINE SULFATE AND AMPHETAMINE SULFATE 10 MILLIGRAM(S): 1.875; 1.875; 1.875; 1.875 TABLET ORAL at 08:11

## 2017-06-18 RX ADMIN — Medication 1 MILLIGRAM(S): at 21:18

## 2017-06-18 RX ADMIN — Medication 1 TABLET(S): at 08:11

## 2017-06-18 RX ADMIN — Medication 1 MILLIGRAM(S): at 12:44

## 2017-06-18 RX ADMIN — Medication 1 TABLET(S): at 16:36

## 2017-06-18 RX ADMIN — PANTOPRAZOLE SODIUM 40 MILLIGRAM(S): 20 TABLET, DELAYED RELEASE ORAL at 08:11

## 2017-06-19 PROCEDURE — 99232 SBSQ HOSP IP/OBS MODERATE 35: CPT

## 2017-06-19 PROCEDURE — 99222 1ST HOSP IP/OBS MODERATE 55: CPT | Mod: GC

## 2017-06-19 RX ORDER — ALPRAZOLAM 0.25 MG
1 TABLET ORAL
Qty: 0 | Refills: 0 | Status: DISCONTINUED | OUTPATIENT
Start: 2017-06-19 | End: 2017-06-26

## 2017-06-19 RX ORDER — DEXTROAMPHETAMINE SACCHARATE, AMPHETAMINE ASPARTATE, DEXTROAMPHETAMINE SULFATE AND AMPHETAMINE SULFATE 1.875; 1.875; 1.875; 1.875 MG/1; MG/1; MG/1; MG/1
10 TABLET ORAL
Qty: 0 | Refills: 0 | Status: DISCONTINUED | OUTPATIENT
Start: 2017-06-20 | End: 2017-06-20

## 2017-06-19 RX ORDER — ALPRAZOLAM 0.25 MG
0.5 TABLET ORAL
Qty: 0 | Refills: 0 | Status: DISCONTINUED | OUTPATIENT
Start: 2017-06-20 | End: 2017-06-21

## 2017-06-19 RX ADMIN — Medication 125 MILLIGRAM(S): at 09:31

## 2017-06-19 RX ADMIN — Medication 40 MILLIGRAM(S): at 09:30

## 2017-06-19 RX ADMIN — Medication 100 MILLIGRAM(S): at 20:27

## 2017-06-19 RX ADMIN — Medication 1 MILLIGRAM(S): at 20:27

## 2017-06-19 RX ADMIN — ARIPIPRAZOLE 30 MILLIGRAM(S): 15 TABLET ORAL at 09:30

## 2017-06-19 RX ADMIN — Medication 100 MILLIGRAM(S): at 09:30

## 2017-06-19 RX ADMIN — Medication 1 MILLIGRAM(S): at 09:30

## 2017-06-19 RX ADMIN — Medication 1 TABLET(S): at 09:30

## 2017-06-19 RX ADMIN — DEXTROAMPHETAMINE SACCHARATE, AMPHETAMINE ASPARTATE, DEXTROAMPHETAMINE SULFATE AND AMPHETAMINE SULFATE 10 MILLIGRAM(S): 1.875; 1.875; 1.875; 1.875 TABLET ORAL at 09:30

## 2017-06-19 RX ADMIN — DEXTROAMPHETAMINE SACCHARATE, AMPHETAMINE ASPARTATE, DEXTROAMPHETAMINE SULFATE AND AMPHETAMINE SULFATE 10 MILLIGRAM(S): 1.875; 1.875; 1.875; 1.875 TABLET ORAL at 13:46

## 2017-06-19 RX ADMIN — PANTOPRAZOLE SODIUM 40 MILLIGRAM(S): 20 TABLET, DELAYED RELEASE ORAL at 08:31

## 2017-06-19 RX ADMIN — ATORVASTATIN CALCIUM 10 MILLIGRAM(S): 80 TABLET, FILM COATED ORAL at 20:27

## 2017-06-19 RX ADMIN — Medication 1 TABLET(S): at 16:53

## 2017-06-19 RX ADMIN — Medication 1 MILLIGRAM(S): at 13:46

## 2017-06-20 PROCEDURE — 99232 SBSQ HOSP IP/OBS MODERATE 35: CPT

## 2017-06-20 RX ORDER — PREGABALIN 225 MG/1
1000 CAPSULE ORAL
Qty: 0 | Refills: 0 | Status: DISCONTINUED | OUTPATIENT
Start: 2017-06-20 | End: 2017-06-27

## 2017-06-20 RX ORDER — PREGABALIN 225 MG/1
1000 CAPSULE ORAL ONCE
Qty: 0 | Refills: 0 | Status: DISCONTINUED | OUTPATIENT
Start: 2017-06-20 | End: 2017-06-20

## 2017-06-20 RX ORDER — DIVALPROEX SODIUM 500 MG/1
1000 TABLET, DELAYED RELEASE ORAL AT BEDTIME
Qty: 0 | Refills: 0 | Status: DISCONTINUED | OUTPATIENT
Start: 2017-06-20 | End: 2017-06-21

## 2017-06-20 RX ADMIN — DEXTROAMPHETAMINE SACCHARATE, AMPHETAMINE ASPARTATE, DEXTROAMPHETAMINE SULFATE AND AMPHETAMINE SULFATE 10 MILLIGRAM(S): 1.875; 1.875; 1.875; 1.875 TABLET ORAL at 09:29

## 2017-06-20 RX ADMIN — Medication 0.5 MILLIGRAM(S): at 12:59

## 2017-06-20 RX ADMIN — Medication 100 MILLIGRAM(S): at 21:23

## 2017-06-20 RX ADMIN — PANTOPRAZOLE SODIUM 40 MILLIGRAM(S): 20 TABLET, DELAYED RELEASE ORAL at 09:29

## 2017-06-20 RX ADMIN — Medication 125 MILLIGRAM(S): at 09:29

## 2017-06-20 RX ADMIN — Medication 1 TABLET(S): at 09:29

## 2017-06-20 RX ADMIN — Medication 1 MILLIGRAM(S): at 21:23

## 2017-06-20 RX ADMIN — DIVALPROEX SODIUM 1000 MILLIGRAM(S): 500 TABLET, DELAYED RELEASE ORAL at 21:23

## 2017-06-20 RX ADMIN — ATORVASTATIN CALCIUM 10 MILLIGRAM(S): 80 TABLET, FILM COATED ORAL at 21:23

## 2017-06-20 RX ADMIN — Medication 40 MILLIGRAM(S): at 09:29

## 2017-06-20 RX ADMIN — ARIPIPRAZOLE 30 MILLIGRAM(S): 15 TABLET ORAL at 09:29

## 2017-06-20 RX ADMIN — Medication 100 MILLIGRAM(S): at 09:29

## 2017-06-20 RX ADMIN — Medication 1 MILLIGRAM(S): at 09:29

## 2017-06-20 RX ADMIN — PREGABALIN 1000 MICROGRAM(S): 225 CAPSULE ORAL at 18:54

## 2017-06-21 LAB
24R-OH-CALCIDIOL SERPL-MCNC: 16.8 NG/ML — LOW (ref 30–100)
VIT B12 SERPL-MCNC: > 2000 PG/ML — HIGH (ref 200–900)

## 2017-06-21 PROCEDURE — 99232 SBSQ HOSP IP/OBS MODERATE 35: CPT

## 2017-06-21 RX ORDER — OXCARBAZEPINE 300 MG/1
150 TABLET, FILM COATED ORAL
Qty: 0 | Refills: 0 | Status: DISCONTINUED | OUTPATIENT
Start: 2017-06-21 | End: 2017-06-23

## 2017-06-21 RX ORDER — TOPIRAMATE 25 MG
125 TABLET ORAL DAILY
Qty: 0 | Refills: 0 | Status: DISCONTINUED | OUTPATIENT
Start: 2017-06-22 | End: 2017-06-27

## 2017-06-21 RX ORDER — TOPIRAMATE 25 MG
100 TABLET ORAL AT BEDTIME
Qty: 0 | Refills: 0 | Status: DISCONTINUED | OUTPATIENT
Start: 2017-06-21 | End: 2017-06-27

## 2017-06-21 RX ADMIN — Medication 1 MILLIGRAM(S): at 21:23

## 2017-06-21 RX ADMIN — Medication 1 TABLET(S): at 09:00

## 2017-06-21 RX ADMIN — Medication 100 MILLIGRAM(S): at 21:23

## 2017-06-21 RX ADMIN — Medication 100 MILLIGRAM(S): at 21:24

## 2017-06-21 RX ADMIN — Medication 40 MILLIGRAM(S): at 09:00

## 2017-06-21 RX ADMIN — ATORVASTATIN CALCIUM 10 MILLIGRAM(S): 80 TABLET, FILM COATED ORAL at 21:23

## 2017-06-21 RX ADMIN — PANTOPRAZOLE SODIUM 40 MILLIGRAM(S): 20 TABLET, DELAYED RELEASE ORAL at 09:00

## 2017-06-21 RX ADMIN — Medication 1 TABLET(S): at 17:25

## 2017-06-21 RX ADMIN — Medication 125 MILLIGRAM(S): at 09:00

## 2017-06-21 RX ADMIN — ARIPIPRAZOLE 30 MILLIGRAM(S): 15 TABLET ORAL at 09:00

## 2017-06-21 RX ADMIN — OXCARBAZEPINE 150 MILLIGRAM(S): 300 TABLET, FILM COATED ORAL at 21:24

## 2017-06-21 RX ADMIN — Medication 100 MILLIGRAM(S): at 09:00

## 2017-06-21 RX ADMIN — Medication 1 MILLIGRAM(S): at 09:00

## 2017-06-22 PROCEDURE — 99232 SBSQ HOSP IP/OBS MODERATE 35: CPT

## 2017-06-22 RX ORDER — CHOLECALCIFEROL (VITAMIN D3) 125 MCG
1000 CAPSULE ORAL DAILY
Qty: 0 | Refills: 0 | Status: DISCONTINUED | OUTPATIENT
Start: 2017-06-22 | End: 2017-06-27

## 2017-06-22 RX ORDER — IBUPROFEN 200 MG
400 TABLET ORAL ONCE
Qty: 0 | Refills: 0 | Status: COMPLETED | OUTPATIENT
Start: 2017-06-22 | End: 2017-06-22

## 2017-06-22 RX ADMIN — OXCARBAZEPINE 150 MILLIGRAM(S): 300 TABLET, FILM COATED ORAL at 09:18

## 2017-06-22 RX ADMIN — Medication 400 MILLIGRAM(S): at 16:43

## 2017-06-22 RX ADMIN — PANTOPRAZOLE SODIUM 40 MILLIGRAM(S): 20 TABLET, DELAYED RELEASE ORAL at 09:18

## 2017-06-22 RX ADMIN — Medication 1 MILLIGRAM(S): at 20:48

## 2017-06-22 RX ADMIN — OXCARBAZEPINE 150 MILLIGRAM(S): 300 TABLET, FILM COATED ORAL at 20:48

## 2017-06-22 RX ADMIN — Medication 100 MILLIGRAM(S): at 20:48

## 2017-06-22 RX ADMIN — ATORVASTATIN CALCIUM 10 MILLIGRAM(S): 80 TABLET, FILM COATED ORAL at 20:48

## 2017-06-22 RX ADMIN — Medication 40 MILLIGRAM(S): at 09:18

## 2017-06-22 RX ADMIN — Medication 100 MILLIGRAM(S): at 09:18

## 2017-06-22 RX ADMIN — Medication 1000 UNIT(S): at 16:43

## 2017-06-22 RX ADMIN — Medication 1 TABLET(S): at 09:18

## 2017-06-22 RX ADMIN — Medication 1 TABLET(S): at 16:43

## 2017-06-22 RX ADMIN — ARIPIPRAZOLE 30 MILLIGRAM(S): 15 TABLET ORAL at 09:18

## 2017-06-22 RX ADMIN — Medication 1 MILLIGRAM(S): at 09:18

## 2017-06-22 RX ADMIN — Medication 125 MILLIGRAM(S): at 09:18

## 2017-06-23 PROCEDURE — 90853 GROUP PSYCHOTHERAPY: CPT

## 2017-06-23 PROCEDURE — 99233 SBSQ HOSP IP/OBS HIGH 50: CPT

## 2017-06-23 RX ORDER — OXCARBAZEPINE 300 MG/1
300 TABLET, FILM COATED ORAL
Qty: 0 | Refills: 0 | Status: DISCONTINUED | OUTPATIENT
Start: 2017-06-23 | End: 2017-06-26

## 2017-06-23 RX ADMIN — Medication 1 MILLIGRAM(S): at 08:43

## 2017-06-23 RX ADMIN — OXCARBAZEPINE 150 MILLIGRAM(S): 300 TABLET, FILM COATED ORAL at 08:43

## 2017-06-23 RX ADMIN — Medication 1 TABLET(S): at 08:43

## 2017-06-23 RX ADMIN — ATORVASTATIN CALCIUM 10 MILLIGRAM(S): 80 TABLET, FILM COATED ORAL at 22:01

## 2017-06-23 RX ADMIN — Medication 40 MILLIGRAM(S): at 08:43

## 2017-06-23 RX ADMIN — ARIPIPRAZOLE 30 MILLIGRAM(S): 15 TABLET ORAL at 08:43

## 2017-06-23 RX ADMIN — Medication 100 MILLIGRAM(S): at 22:01

## 2017-06-23 RX ADMIN — Medication 1000 UNIT(S): at 08:42

## 2017-06-23 RX ADMIN — Medication 1 TABLET(S): at 16:27

## 2017-06-23 RX ADMIN — Medication 1 MILLIGRAM(S): at 22:00

## 2017-06-23 RX ADMIN — Medication 125 MILLIGRAM(S): at 09:07

## 2017-06-23 RX ADMIN — Medication 100 MILLIGRAM(S): at 08:42

## 2017-06-23 RX ADMIN — OXCARBAZEPINE 300 MILLIGRAM(S): 300 TABLET, FILM COATED ORAL at 22:01

## 2017-06-23 RX ADMIN — PANTOPRAZOLE SODIUM 40 MILLIGRAM(S): 20 TABLET, DELAYED RELEASE ORAL at 08:43

## 2017-06-23 NOTE — PROGRESS NOTE ADULT - ASSESSMENT
43 yo F w/ history of astrocytoma (R temporal lobe) s/p adjuvant chemo/RT and resection x2 c/b perioperative CVA (2015), morbid obsity, HLD, at [facility] for behavioral changes, with low B12 and vitamin D levels.    Plan:  Vitamin B12 insufficiency: May derive neuropsychibenefit from supplementation when level is below 400. Would give 1000mcg IM weekly and check level before 5th dose.  Oral supplementation may also be used, 500mcg daily.    Vitamin D insufficiency: would supplement 1000 IU daily.    Astrocytoma: Stable at this time, recent neuro w/u. Outpt follow up with neuro-oncologist.    Prediabetes, morbid obesity, HLD: continue statin, exercise, weight loss, sugar avoidance.    Mood disorder; management per primary team.

## 2017-06-24 RX ORDER — ACETAMINOPHEN 500 MG
650 TABLET ORAL EVERY 6 HOURS
Qty: 0 | Refills: 0 | Status: DISCONTINUED | OUTPATIENT
Start: 2017-06-24 | End: 2017-06-27

## 2017-06-24 RX ADMIN — Medication 650 MILLIGRAM(S): at 16:56

## 2017-06-24 RX ADMIN — Medication 1 TABLET(S): at 09:43

## 2017-06-24 RX ADMIN — Medication 1 TABLET(S): at 16:04

## 2017-06-24 RX ADMIN — Medication 100 MILLIGRAM(S): at 21:41

## 2017-06-24 RX ADMIN — Medication 1000 UNIT(S): at 09:43

## 2017-06-24 RX ADMIN — Medication 40 MILLIGRAM(S): at 09:24

## 2017-06-24 RX ADMIN — Medication 125 MILLIGRAM(S): at 09:43

## 2017-06-24 RX ADMIN — OXCARBAZEPINE 300 MILLIGRAM(S): 300 TABLET, FILM COATED ORAL at 21:41

## 2017-06-24 RX ADMIN — ARIPIPRAZOLE 30 MILLIGRAM(S): 15 TABLET ORAL at 09:43

## 2017-06-24 RX ADMIN — PANTOPRAZOLE SODIUM 40 MILLIGRAM(S): 20 TABLET, DELAYED RELEASE ORAL at 09:43

## 2017-06-24 RX ADMIN — OXCARBAZEPINE 300 MILLIGRAM(S): 300 TABLET, FILM COATED ORAL at 09:43

## 2017-06-24 RX ADMIN — ATORVASTATIN CALCIUM 10 MILLIGRAM(S): 80 TABLET, FILM COATED ORAL at 21:41

## 2017-06-24 RX ADMIN — Medication 100 MILLIGRAM(S): at 09:43

## 2017-06-24 RX ADMIN — Medication 1 MILLIGRAM(S): at 09:43

## 2017-06-24 RX ADMIN — Medication 1 MILLIGRAM(S): at 21:41

## 2017-06-24 RX ADMIN — Medication 650 MILLIGRAM(S): at 18:25

## 2017-06-25 RX ADMIN — Medication 100 MILLIGRAM(S): at 20:57

## 2017-06-25 RX ADMIN — ATORVASTATIN CALCIUM 10 MILLIGRAM(S): 80 TABLET, FILM COATED ORAL at 20:57

## 2017-06-25 RX ADMIN — Medication 100 MILLIGRAM(S): at 10:14

## 2017-06-25 RX ADMIN — Medication 1 TABLET(S): at 10:14

## 2017-06-25 RX ADMIN — Medication 40 MILLIGRAM(S): at 10:14

## 2017-06-25 RX ADMIN — Medication 125 MILLIGRAM(S): at 10:14

## 2017-06-25 RX ADMIN — Medication 1 MILLIGRAM(S): at 10:14

## 2017-06-25 RX ADMIN — OXCARBAZEPINE 300 MILLIGRAM(S): 300 TABLET, FILM COATED ORAL at 10:14

## 2017-06-25 RX ADMIN — Medication 650 MILLIGRAM(S): at 10:15

## 2017-06-25 RX ADMIN — Medication 1000 UNIT(S): at 10:14

## 2017-06-25 RX ADMIN — Medication 1 MILLIGRAM(S): at 20:57

## 2017-06-25 RX ADMIN — OXCARBAZEPINE 300 MILLIGRAM(S): 300 TABLET, FILM COATED ORAL at 20:57

## 2017-06-25 RX ADMIN — Medication 1 TABLET(S): at 16:26

## 2017-06-25 RX ADMIN — ARIPIPRAZOLE 30 MILLIGRAM(S): 15 TABLET ORAL at 10:14

## 2017-06-25 RX ADMIN — PANTOPRAZOLE SODIUM 40 MILLIGRAM(S): 20 TABLET, DELAYED RELEASE ORAL at 10:14

## 2017-06-26 PROCEDURE — 90853 GROUP PSYCHOTHERAPY: CPT

## 2017-06-26 PROCEDURE — 99232 SBSQ HOSP IP/OBS MODERATE 35: CPT | Mod: 25

## 2017-06-26 RX ORDER — OXCARBAZEPINE 300 MG/1
300 TABLET, FILM COATED ORAL DAILY
Qty: 0 | Refills: 0 | Status: DISCONTINUED | OUTPATIENT
Start: 2017-06-26 | End: 2017-06-27

## 2017-06-26 RX ORDER — OXCARBAZEPINE 300 MG/1
600 TABLET, FILM COATED ORAL AT BEDTIME
Qty: 0 | Refills: 0 | Status: DISCONTINUED | OUTPATIENT
Start: 2017-06-26 | End: 2017-06-27

## 2017-06-26 RX ADMIN — Medication 1000 UNIT(S): at 08:09

## 2017-06-26 RX ADMIN — Medication 1 TABLET(S): at 16:44

## 2017-06-26 RX ADMIN — Medication 100 MILLIGRAM(S): at 21:21

## 2017-06-26 RX ADMIN — SENNA PLUS 1 TABLET(S): 8.6 TABLET ORAL at 04:30

## 2017-06-26 RX ADMIN — Medication 1 MILLIGRAM(S): at 21:21

## 2017-06-26 RX ADMIN — Medication 1 TABLET(S): at 08:10

## 2017-06-26 RX ADMIN — OXCARBAZEPINE 300 MILLIGRAM(S): 300 TABLET, FILM COATED ORAL at 21:21

## 2017-06-26 RX ADMIN — ATORVASTATIN CALCIUM 10 MILLIGRAM(S): 80 TABLET, FILM COATED ORAL at 21:21

## 2017-06-26 RX ADMIN — Medication 650 MILLIGRAM(S): at 22:03

## 2017-06-26 RX ADMIN — PANTOPRAZOLE SODIUM 40 MILLIGRAM(S): 20 TABLET, DELAYED RELEASE ORAL at 08:10

## 2017-06-26 RX ADMIN — Medication 125 MILLIGRAM(S): at 08:11

## 2017-06-26 RX ADMIN — Medication 1 MILLIGRAM(S): at 08:09

## 2017-06-26 RX ADMIN — OXCARBAZEPINE 300 MILLIGRAM(S): 300 TABLET, FILM COATED ORAL at 08:10

## 2017-06-26 RX ADMIN — Medication 100 MILLIGRAM(S): at 08:09

## 2017-06-26 RX ADMIN — Medication 40 MILLIGRAM(S): at 08:09

## 2017-06-26 RX ADMIN — Medication 650 MILLIGRAM(S): at 21:08

## 2017-06-26 RX ADMIN — OXCARBAZEPINE 600 MILLIGRAM(S): 300 TABLET, FILM COATED ORAL at 21:21

## 2017-06-26 RX ADMIN — ARIPIPRAZOLE 30 MILLIGRAM(S): 15 TABLET ORAL at 08:09

## 2017-06-27 VITALS — TEMPERATURE: 98 F

## 2017-06-27 PROCEDURE — 99238 HOSP IP/OBS DSCHRG MGMT 30/<: CPT

## 2017-06-27 RX ORDER — TOPIRAMATE 25 MG
1 TABLET ORAL
Qty: 15 | Refills: 0 | OUTPATIENT
Start: 2017-06-27 | End: 2017-07-12

## 2017-06-27 RX ORDER — FLUOXETINE HCL 10 MG
1 CAPSULE ORAL
Qty: 15 | Refills: 0 | OUTPATIENT
Start: 2017-06-27 | End: 2017-07-12

## 2017-06-27 RX ORDER — LACTOBACILLUS ACIDOPHILUS 100MM CELL
0 CAPSULE ORAL
Qty: 0 | Refills: 0 | COMMUNITY
Start: 2017-06-27

## 2017-06-27 RX ORDER — CHOLECALCIFEROL (VITAMIN D3) 125 MCG
1000 CAPSULE ORAL
Qty: 15000 | Refills: 0 | OUTPATIENT
Start: 2017-06-27 | End: 2017-07-12

## 2017-06-27 RX ORDER — ATORVASTATIN CALCIUM 80 MG/1
1 TABLET, FILM COATED ORAL
Qty: 15 | Refills: 0 | OUTPATIENT
Start: 2017-06-27 | End: 2017-07-12

## 2017-06-27 RX ORDER — ALPRAZOLAM 0.25 MG
1 TABLET ORAL
Qty: 0 | Refills: 0 | Status: DISCONTINUED | OUTPATIENT
Start: 2017-06-27 | End: 2017-06-27

## 2017-06-27 RX ORDER — ARIPIPRAZOLE 15 MG/1
1 TABLET ORAL
Qty: 15 | Refills: 0 | OUTPATIENT
Start: 2017-06-27 | End: 2017-07-12

## 2017-06-27 RX ORDER — TOPIRAMATE 25 MG
5 TABLET ORAL
Qty: 75 | Refills: 0 | OUTPATIENT
Start: 2017-06-27 | End: 2017-07-12

## 2017-06-27 RX ORDER — OXCARBAZEPINE 300 MG/1
1 TABLET, FILM COATED ORAL
Qty: 15 | Refills: 0 | OUTPATIENT
Start: 2017-06-27 | End: 2017-07-12

## 2017-06-27 RX ORDER — DOCUSATE SODIUM 100 MG
1 CAPSULE ORAL
Qty: 30 | Refills: 0 | OUTPATIENT
Start: 2017-06-27 | End: 2017-07-12

## 2017-06-27 RX ORDER — PANTOPRAZOLE SODIUM 20 MG/1
1 TABLET, DELAYED RELEASE ORAL
Qty: 15 | Refills: 0 | OUTPATIENT
Start: 2017-06-27 | End: 2017-07-12

## 2017-06-27 RX ORDER — ALPRAZOLAM 0.25 MG
1 TABLET ORAL
Qty: 30 | Refills: 0 | OUTPATIENT
Start: 2017-06-27 | End: 2017-07-12

## 2017-06-27 RX ADMIN — PANTOPRAZOLE SODIUM 40 MILLIGRAM(S): 20 TABLET, DELAYED RELEASE ORAL at 08:57

## 2017-06-27 RX ADMIN — Medication 1 MILLIGRAM(S): at 10:12

## 2017-06-27 RX ADMIN — OXCARBAZEPINE 300 MILLIGRAM(S): 300 TABLET, FILM COATED ORAL at 08:57

## 2017-06-27 RX ADMIN — Medication 1000 UNIT(S): at 08:57

## 2017-06-27 RX ADMIN — ARIPIPRAZOLE 30 MILLIGRAM(S): 15 TABLET ORAL at 08:57

## 2017-06-27 RX ADMIN — Medication 40 MILLIGRAM(S): at 08:57

## 2017-06-27 RX ADMIN — Medication 125 MILLIGRAM(S): at 08:57

## 2017-06-27 RX ADMIN — Medication 1 TABLET(S): at 08:57

## 2017-06-27 RX ADMIN — Medication 100 MILLIGRAM(S): at 08:57

## 2017-06-27 RX ADMIN — PREGABALIN 1000 MICROGRAM(S): 225 CAPSULE ORAL at 13:35

## 2017-06-28 ENCOUNTER — OUTPATIENT (OUTPATIENT)
Dept: OUTPATIENT SERVICES | Facility: HOSPITAL | Age: 42
LOS: 1 days | Discharge: PSYCHIATRIC OR SUBSTANCE USE FACILITY | End: 2017-06-28
Payer: COMMERCIAL

## 2017-06-28 DIAGNOSIS — C71.2 MALIGNANT NEOPLASM OF TEMPORAL LOBE: Chronic | ICD-10-CM

## 2017-06-29 DIAGNOSIS — F06.33 MOOD DISORDER DUE TO KNOWN PHYSIOLOGICAL CONDITION WITH MANIC FEATURES: ICD-10-CM

## 2017-09-12 ENCOUNTER — EMERGENCY (EMERGENCY)
Facility: HOSPITAL | Age: 42
LOS: 1 days | Discharge: HOME | End: 2017-09-12
Attending: EMERGENCY MEDICINE | Admitting: EMERGENCY MEDICINE
Payer: COMMERCIAL

## 2017-09-12 VITALS
HEART RATE: 80 BPM | TEMPERATURE: 98 F | SYSTOLIC BLOOD PRESSURE: 125 MMHG | OXYGEN SATURATION: 100 % | DIASTOLIC BLOOD PRESSURE: 65 MMHG | RESPIRATION RATE: 16 BRPM

## 2017-09-12 VITALS
SYSTOLIC BLOOD PRESSURE: 140 MMHG | OXYGEN SATURATION: 100 % | TEMPERATURE: 98 F | DIASTOLIC BLOOD PRESSURE: 70 MMHG | RESPIRATION RATE: 20 BRPM | HEART RATE: 92 BPM

## 2017-09-12 DIAGNOSIS — C71.2 MALIGNANT NEOPLASM OF TEMPORAL LOBE: Chronic | ICD-10-CM

## 2017-09-12 LAB
ALBUMIN SERPL ELPH-MCNC: 4.3 G/DL — SIGNIFICANT CHANGE UP (ref 3.3–5)
ALP SERPL-CCNC: 97 U/L — SIGNIFICANT CHANGE UP (ref 40–120)
ALT FLD-CCNC: 14 U/L — SIGNIFICANT CHANGE UP (ref 4–33)
APPEARANCE UR: CLEAR — SIGNIFICANT CHANGE UP
AST SERPL-CCNC: 26 U/L — SIGNIFICANT CHANGE UP (ref 4–32)
BASOPHILS # BLD AUTO: 0.02 K/UL — SIGNIFICANT CHANGE UP (ref 0–0.2)
BASOPHILS NFR BLD AUTO: 0.2 % — SIGNIFICANT CHANGE UP (ref 0–2)
BILIRUB SERPL-MCNC: 0.3 MG/DL — SIGNIFICANT CHANGE UP (ref 0.2–1.2)
BILIRUB UR-MCNC: NEGATIVE — SIGNIFICANT CHANGE UP
BLOOD UR QL VISUAL: HIGH
BUN SERPL-MCNC: 11 MG/DL — SIGNIFICANT CHANGE UP (ref 7–23)
CALCIUM SERPL-MCNC: 8.8 MG/DL — SIGNIFICANT CHANGE UP (ref 8.4–10.5)
CHLORIDE SERPL-SCNC: 105 MMOL/L — SIGNIFICANT CHANGE UP (ref 98–107)
CO2 SERPL-SCNC: 22 MMOL/L — SIGNIFICANT CHANGE UP (ref 22–31)
COLOR SPEC: SIGNIFICANT CHANGE UP
CREAT SERPL-MCNC: 1 MG/DL — SIGNIFICANT CHANGE UP (ref 0.5–1.3)
EOSINOPHIL # BLD AUTO: 0.01 K/UL — SIGNIFICANT CHANGE UP (ref 0–0.5)
EOSINOPHIL NFR BLD AUTO: 0.1 % — SIGNIFICANT CHANGE UP (ref 0–6)
GLUCOSE SERPL-MCNC: 122 MG/DL — HIGH (ref 70–99)
GLUCOSE UR-MCNC: NEGATIVE — SIGNIFICANT CHANGE UP
HCG SERPL-ACNC: < 5 MIU/ML — SIGNIFICANT CHANGE UP
HCT VFR BLD CALC: 33 % — LOW (ref 34.5–45)
HGB BLD-MCNC: 10.4 G/DL — LOW (ref 11.5–15.5)
IMM GRANULOCYTES # BLD AUTO: 0.04 # — SIGNIFICANT CHANGE UP
IMM GRANULOCYTES NFR BLD AUTO: 0.3 % — SIGNIFICANT CHANGE UP (ref 0–1.5)
KETONES UR-MCNC: NEGATIVE — SIGNIFICANT CHANGE UP
LEUKOCYTE ESTERASE UR-ACNC: NEGATIVE — SIGNIFICANT CHANGE UP
LIDOCAIN IGE QN: 15.5 U/L — SIGNIFICANT CHANGE UP (ref 7–60)
LYMPHOCYTES # BLD AUTO: 0.86 K/UL — LOW (ref 1–3.3)
LYMPHOCYTES # BLD AUTO: 7.4 % — LOW (ref 13–44)
MCHC RBC-ENTMCNC: 26.7 PG — LOW (ref 27–34)
MCHC RBC-ENTMCNC: 31.5 % — LOW (ref 32–36)
MCV RBC AUTO: 84.8 FL — SIGNIFICANT CHANGE UP (ref 80–100)
MONOCYTES # BLD AUTO: 0.44 K/UL — SIGNIFICANT CHANGE UP (ref 0–0.9)
MONOCYTES NFR BLD AUTO: 3.8 % — SIGNIFICANT CHANGE UP (ref 2–14)
MUCOUS THREADS # UR AUTO: SIGNIFICANT CHANGE UP
NEUTROPHILS # BLD AUTO: 10.24 K/UL — HIGH (ref 1.8–7.4)
NEUTROPHILS NFR BLD AUTO: 88.2 % — HIGH (ref 43–77)
NITRITE UR-MCNC: NEGATIVE — SIGNIFICANT CHANGE UP
NON-SQ EPI CELLS # UR AUTO: <1 — SIGNIFICANT CHANGE UP
NRBC # FLD: 0 — SIGNIFICANT CHANGE UP
PH UR: 7.5 — SIGNIFICANT CHANGE UP (ref 4.6–8)
PLATELET # BLD AUTO: 195 K/UL — SIGNIFICANT CHANGE UP (ref 150–400)
PMV BLD: 11.7 FL — SIGNIFICANT CHANGE UP (ref 7–13)
POTASSIUM SERPL-MCNC: 4.5 MMOL/L — SIGNIFICANT CHANGE UP (ref 3.5–5.3)
POTASSIUM SERPL-SCNC: 4.5 MMOL/L — SIGNIFICANT CHANGE UP (ref 3.5–5.3)
PROT SERPL-MCNC: 7 G/DL — SIGNIFICANT CHANGE UP (ref 6–8.3)
PROT UR-MCNC: 10 — SIGNIFICANT CHANGE UP
RBC # BLD: 3.89 M/UL — SIGNIFICANT CHANGE UP (ref 3.8–5.2)
RBC # FLD: 14.6 % — HIGH (ref 10.3–14.5)
RBC CASTS # UR COMP ASSIST: SIGNIFICANT CHANGE UP (ref 0–?)
SODIUM SERPL-SCNC: 141 MMOL/L — SIGNIFICANT CHANGE UP (ref 135–145)
SP GR SPEC: 1.01 — SIGNIFICANT CHANGE UP (ref 1–1.03)
SQUAMOUS # UR AUTO: SIGNIFICANT CHANGE UP
UROBILINOGEN FLD QL: NORMAL E.U. — SIGNIFICANT CHANGE UP (ref 0.1–0.2)
WBC # BLD: 11.61 K/UL — HIGH (ref 3.8–10.5)
WBC # FLD AUTO: 11.61 K/UL — HIGH (ref 3.8–10.5)
WBC UR QL: SIGNIFICANT CHANGE UP (ref 0–?)

## 2017-09-12 PROCEDURE — 99285 EMERGENCY DEPT VISIT HI MDM: CPT | Mod: 25

## 2017-09-12 PROCEDURE — 74177 CT ABD & PELVIS W/CONTRAST: CPT | Mod: 26

## 2017-09-12 RX ORDER — ONDANSETRON 8 MG/1
4 TABLET, FILM COATED ORAL ONCE
Qty: 0 | Refills: 0 | Status: COMPLETED | OUTPATIENT
Start: 2017-09-12 | End: 2017-09-12

## 2017-09-12 RX ORDER — SODIUM CHLORIDE 9 MG/ML
1000 INJECTION INTRAMUSCULAR; INTRAVENOUS; SUBCUTANEOUS ONCE
Qty: 0 | Refills: 0 | Status: COMPLETED | OUTPATIENT
Start: 2017-09-12 | End: 2017-09-12

## 2017-09-12 RX ORDER — MORPHINE SULFATE 50 MG/1
4 CAPSULE, EXTENDED RELEASE ORAL ONCE
Qty: 0 | Refills: 0 | Status: DISCONTINUED | OUTPATIENT
Start: 2017-09-12 | End: 2017-09-12

## 2017-09-12 RX ADMIN — SODIUM CHLORIDE 1000 MILLILITER(S): 9 INJECTION INTRAMUSCULAR; INTRAVENOUS; SUBCUTANEOUS at 06:02

## 2017-09-12 RX ADMIN — ONDANSETRON 4 MILLIGRAM(S): 8 TABLET, FILM COATED ORAL at 06:02

## 2017-09-12 RX ADMIN — MORPHINE SULFATE 4 MILLIGRAM(S): 50 CAPSULE, EXTENDED RELEASE ORAL at 06:17

## 2017-09-12 RX ADMIN — MORPHINE SULFATE 4 MILLIGRAM(S): 50 CAPSULE, EXTENDED RELEASE ORAL at 06:02

## 2017-09-12 NOTE — ED ADULT TRIAGE NOTE - CHIEF COMPLAINT QUOTE
Pt. brought to [facility] ED for LLQ pain that started this evening. Pt. vomited x 5. Pt. states it was blood tinged. NAD noted. c/o nausea at triage. Pt. appears comfortable at triage.

## 2017-09-12 NOTE — ED PROVIDER NOTE - MEDICAL DECISION MAKING DETAILS
43 y/o female w/ LLQ pain for 1 day w/ associated nausea and vomiting. Given hx and physical, low suspicion for sbo but will evaluate. Also considering viral enteritis vs diverticulitis. Will send basic labs, IVF,analgesics, antiemetics, possibly dispo to home if patient shows sign of improvement and labs/imaging unremarkable and patient can tolerate PO.

## 2017-09-12 NOTE — ED ADULT NURSE NOTE - CHIEF COMPLAINT QUOTE
Pt. brought to [facility] ED for LLQ pain that started this evening. Pt. vomited x 5. Pt. states it was blood tinged. NAD noted. c/o nausea at triage. Pt. appears comfortable at triage.

## 2017-09-12 NOTE — ED PROVIDER NOTE - PROGRESS NOTE DETAILS
Pt with parents, belly soft, nontender, pt eating.  Put in call to Dr. Smith but VM full- family knows to call for appt tomorrow and need  followup.  Improved for discharge home.

## 2017-09-12 NOTE — ED PROVIDER NOTE - ATTENDING CONTRIBUTION TO CARE
pt with recurrent visits for pelvic pain.  History of dermoid.  Once again with same pain. Two days ago unable to visualize the ovary so will get new US. Pt abd is rather benign.  Will likely require GYN eval for repeat visits pt with lower abdominal pain - TTP in the LLQ minimally - Will image to ensure no significant pathology.

## 2017-09-12 NOTE — ED ADULT NURSE NOTE - OBJECTIVE STATEMENT
Pt rec'd A&Ox3 accompanied by parent c/o of multiple eopisodes of vomiting and abdominal cramping since last night @ 8pm. Presents to ED because states " I had blood tinged vomit." Pt rpt weakness currently. Rpts hx of Brain CA dx in 2014 treated last year. States has hx of CVA with residual unsteady gait. Denies any blurry vision, +HA. MD evaluated. Will continue to monitor.

## 2017-12-26 PROBLEM — Z00.00 ENCOUNTER FOR PREVENTIVE HEALTH EXAMINATION: Status: ACTIVE | Noted: 2017-12-26

## 2018-01-10 ENCOUNTER — APPOINTMENT (OUTPATIENT)
Dept: GASTROENTEROLOGY | Facility: CLINIC | Age: 43
End: 2018-01-10

## 2018-01-10 ENCOUNTER — OTHER (OUTPATIENT)
Age: 43
End: 2018-01-10

## 2018-01-10 ENCOUNTER — MED ADMIN CHARGE (OUTPATIENT)
Age: 43
End: 2018-01-10

## 2019-11-21 ENCOUNTER — TRANSCRIPTION ENCOUNTER (OUTPATIENT)
Age: 44
End: 2019-11-21

## 2019-12-12 ENCOUNTER — TRANSCRIPTION ENCOUNTER (OUTPATIENT)
Age: 44
End: 2019-12-12

## 2020-01-10 ENCOUNTER — TRANSCRIPTION ENCOUNTER (OUTPATIENT)
Age: 45
End: 2020-01-10

## 2020-01-13 ENCOUNTER — TRANSCRIPTION ENCOUNTER (OUTPATIENT)
Age: 45
End: 2020-01-13

## 2020-01-21 ENCOUNTER — TRANSCRIPTION ENCOUNTER (OUTPATIENT)
Age: 45
End: 2020-01-21

## 2020-01-24 ENCOUNTER — TRANSCRIPTION ENCOUNTER (OUTPATIENT)
Age: 45
End: 2020-01-24

## 2020-06-03 ENCOUNTER — APPOINTMENT (OUTPATIENT)
Dept: NEUROLOGY | Facility: CLINIC | Age: 45
End: 2020-06-03
Payer: COMMERCIAL

## 2020-06-03 PROCEDURE — 99205 OFFICE O/P NEW HI 60 MIN: CPT | Mod: 95

## 2020-06-03 NOTE — DATA REVIEWED
[de-identified] : I personally reviewed and interpreted neuroimaging dated 10/16/2019 with prior imaging dated 4/18/18. \par \par There is no enhancing neoplastic disease, although there is some post-treatment linear enhancement posterior and medial to the resection cavity. The resection cavity involves the majority of the right temporal lobe, including the anterior temporal fossa.\par \par The FLAIR changes involve very small regions, barely into the splenium, but slightly so anteriorly, around the posterior aspect of the resection cavity and involving to a lesser degree the subependymal region of the right lateral ventricle.\par \par There is no change between these two studies. I see no thalamic involvement on either one.

## 2020-06-03 NOTE — DISCUSSION/SUMMARY
[FreeTextEntry1] : BRAIN TUMOR -- I think she is enjoying a prolonged disease-free survival period. how long it will last is challenging to know. She underwent and extensive resection and she is young. She received chemoRT and 6 cycles of post-RT TMZ. If her disease recurred/progressed, then I would rechallenge with TMZ, watching carefully for myelosuppression.\par \par SEIZURES -- Controlled on current regimen. One could argue she no longer requires any AED. will address this over time.\par \par MEMORY LOSS -- She takes Abilify, Fluoxetine, XANAX prn and RItalin prn. Occasionally increasing the SSRI can improve memory. I defer to Dr Palencia, the [facility] psychiatrist she has been seeing for the past two years. She is left handed, so I am impressed her memory is as good as it was today, since her mother is also left handed and that suggest she should be right brain dominant, but she clearly is not.\par \par PSYCH ISSUES -- Appreciate psych management, would continue to closely follow-up with those physicians.\par \par DISPO -- I recommend annual or bi-annual imaging. She could return with the MRI around 6/17/2020 with TEB after that.

## 2020-06-03 NOTE — HISTORY OF PRESENT ILLNESS
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, no FGFR mutation, no EGFRvIII mutation, H3F3 status unknown, BRAF unknown) here for an opinion regarding managing her malignant brain tumor.\par \par FHx: her mother is also left handed.\par \par presented with a seizure\par 12/10/2014 - s/p resection anterior portion STR of tumor\par 1/5/2015 - s/p re-resection posterior portion near GTR of tumor (discharged home on 1/28/2015)\par PATH - AA no necrosis no vascular proliferation, Ki67 as high as 21%.\par perioperative corona radiata CVA - no vessel abnormalities on MRA/CTA\par s/p RT (2/3/15-3/20/15) - unclear if concurrent temozolomide given\par s/p TMZ x 6 cycles (first last October 2015)\par stable disease since then (last saw Kreisl/neuro-onc) about 6 months ago.\par \par Her clinical course has been complicated by height of 5'4 and weight of 170 pounds, 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 She has used keppra and dilantin in the past. Her only seizure was around the time of surgical resection.\par \par Her MR imaging studies have been stable in the recent past. There is a large right temporal resection cavity described, together with minimal ree-resectional posterior and medial linear enhancement in a tumor that did not enhance upon presentation. non-enhancing disease is seen on T2 FLAIR infiltrating the splenium. I note there was modest thalamic T2 disease noted on some of the imaging. \par \par She was due [place] [facility] but has decided to move care to [facility].

## 2020-06-16 ENCOUNTER — OUTPATIENT (OUTPATIENT)
Dept: OUTPATIENT SERVICES | Facility: HOSPITAL | Age: 45
LOS: 1 days | End: 2020-06-16
Payer: COMMERCIAL

## 2020-06-16 ENCOUNTER — RESULT REVIEW (OUTPATIENT)
Age: 45
End: 2020-06-16

## 2020-06-16 ENCOUNTER — APPOINTMENT (OUTPATIENT)
Dept: MRI IMAGING | Facility: CLINIC | Age: 45
End: 2020-06-16
Payer: COMMERCIAL

## 2020-06-16 DIAGNOSIS — C71.9 MALIGNANT NEOPLASM OF BRAIN, UNSPECIFIED: ICD-10-CM

## 2020-06-16 DIAGNOSIS — C71.2 MALIGNANT NEOPLASM OF TEMPORAL LOBE: Chronic | ICD-10-CM

## 2020-06-16 PROCEDURE — 70553 MRI BRAIN STEM W/O & W/DYE: CPT

## 2020-06-16 PROCEDURE — A9585: CPT

## 2020-06-16 PROCEDURE — 70553 MRI BRAIN STEM W/O & W/DYE: CPT | Mod: 26

## 2020-06-17 ENCOUNTER — APPOINTMENT (OUTPATIENT)
Dept: NEUROLOGY | Facility: CLINIC | Age: 45
End: 2020-06-17
Payer: COMMERCIAL

## 2020-06-17 PROCEDURE — 99214 OFFICE O/P EST MOD 30 MIN: CPT | Mod: 95

## 2020-06-17 NOTE — DATA REVIEWED
[de-identified] : I PERSONALLY REVIEWED AND INTERPRETED MR IMAGING DATED 6/16/2020, 10/16/20 (OSH) and 4/25/2019 (OSH). \par \par There is no change in FLAIR weighted small regions of hyperintensity around the resection cavity. There is no enhancing disease. There remains a large, stable right temporal resection cavity.

## 2020-06-17 NOTE — HISTORY OF PRESENT ILLNESS
[FreeTextEntry1] : 46 yo LH woman with RIGHT TEMPORAL anaplastic astrocytoma (IDH mutant, MGMT methylated, no EGFR overexpression, no FGFR mutation, no EGFRvIII mutation, H3F3 status unknown, BRAF unknown) here for an opinion regarding managing her malignant brain tumor.\par \par FHx: her mother is also left handed.\par \par presented with a seizure\par 12/10/2014 - s/p resection anterior portion STR of tumor\par 1/5/2015 - s/p re-resection posterior portion near GTR of tumor (discharged home on 1/28/2015)\par PATH - AA no necrosis no vascular proliferation, Ki67 as high as 21%.\par perioperative corona radiata CVA - no vessel abnormalities on MRA/CTA\par s/p RT (2/3/15-3/20/15) - unclear if concurrent temozolomide given\par s/p TMZ x 6 cycles (first last October 2015)\par stable disease since then (last saw Kreisl/neuro-onc) about 6 months ago.\par \par Her clinical course has been complicated by height of 5'4 and weight of 170 pounds, 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 She has used keppra and dilantin in the past. Her only seizure was around the time of surgical resection.\par \par Her MR imaging studies have been stable in the recent past. There is a large right temporal resection cavity described, together with minimal ree-resectional posterior and medial linear enhancement in a tumor that did not enhance upon presentation. non-enhancing disease is seen on T2 FLAIR infiltrating the splenium. I note there was modest thalamic T2 disease noted on some of the imaging. \par \par She presents today for routine follow-up with new MRI. She has no complaints and is feeling great. \par  \par

## 2020-06-17 NOTE — REASON FOR VISIT
[Follow-Up: _____] : a [unfilled] follow-up visit [Other Location: e.g. Home (Enter Location, City,State)___] : at [unfilled] [Home] : at home, [unfilled] , at the time of the visit. [Family Member] : family member [Verbal consent obtained from patient] : the patient, [unfilled] [Mother] : mother [FreeTextEntry1] : brain tumor [Father] : father

## 2020-06-17 NOTE — DISCUSSION/SUMMARY
[FreeTextEntry1] : Brain tumor - Imaging reviewed, stable, and she is doing well with no complaints. We discussed frequency of scans going forward, and she and her family prefer to schedule next MRI for late November, before a trip to [place]. \par \par DISPO - All questions answered. Follow-up with MRI in late November.

## 2020-08-28 NOTE — BEHAVIORAL HEALTH ASSESSMENT NOTE - NSBHCONSFOLLOWNEEDS_PSY_A_CORE
Dear Team Member,    Per your recent telephone screening with Employee Health:    You will need to schedule a lab appointment to be tested for COVID-19.  Your lab appointment was scheduled during today's call.  You are to remain off work and out of public places except to seek medical care, and complete the symptom tracker daily on [employer].  YOU MAY NOT RETURN TO WORK WITHOUT CLEARANCE FROM EMPLOYEE HEALTH.    Off work start date: 08/28/20             Notify employee health if you have a marked increase or decrease in symptoms between check ins.    For [place] Employees, please follow this link to view a copy of the consent form: [email or web address]    For [place] Employees, please follow this link to view a copy of the consent form: [email or web address]    Thank you,    Employee Health    Cc: Manager   Patient needs further psychiatric safety assessment prior to discharge

## 2020-10-19 ENCOUNTER — OUTPATIENT (OUTPATIENT)
Dept: OUTPATIENT SERVICES | Facility: HOSPITAL | Age: 45
LOS: 1 days | End: 2020-10-19
Payer: COMMERCIAL

## 2020-10-19 ENCOUNTER — APPOINTMENT (OUTPATIENT)
Dept: MRI IMAGING | Facility: CLINIC | Age: 45
End: 2020-10-19
Payer: COMMERCIAL

## 2020-10-19 ENCOUNTER — APPOINTMENT (OUTPATIENT)
Dept: NEUROLOGY | Facility: CLINIC | Age: 45
End: 2020-10-19
Payer: COMMERCIAL

## 2020-10-19 DIAGNOSIS — Z00.8 ENCOUNTER FOR OTHER GENERAL EXAMINATION: ICD-10-CM

## 2020-10-19 DIAGNOSIS — C71.2 MALIGNANT NEOPLASM OF TEMPORAL LOBE: Chronic | ICD-10-CM

## 2020-10-19 PROCEDURE — 99214 OFFICE O/P EST MOD 30 MIN: CPT | Mod: 95

## 2020-10-19 PROCEDURE — 70553 MRI BRAIN STEM W/O & W/DYE: CPT | Mod: 26

## 2020-10-19 PROCEDURE — 70553 MRI BRAIN STEM W/O & W/DYE: CPT

## 2020-10-19 NOTE — DISCUSSION/SUMMARY
[FreeTextEntry1] : BRAIN TUMOR -- She continues to enjoy a prolonged disease-free survival period. She is young, underwent and extensive resection and received chemoRT and 6 cycles of post-RT TMZ. If her disease recurred/progressed, then I would rechallenge with TMZ, watching carefully for myelosuppression. Data supports adjuvant temozolomide conferring a significant benefit (EORTC data).\par \par SEIZURES -- Controlled on current regimen. One could argue she no longer requires any AED. will address this over time.\par \par MEMORY LOSS -- She takes Abilify, Fluoxetine, XANAX prn and RItalin prn. Occasionally increasing the SSRI can improve memory. I defer to Dr Palencia, the [facility] psychiatrist she has been seeing for the past two years. She is left handed, so I am impressed her memory is as good as it was today, since her mother is also left handed and that suggest she should be right brain dominant, but she clearly is not.\par \par PSYCH ISSUES -- Appreciate psych management, would continue to closely follow-up with those physicians.\par \par DISPO -- I recommend repeat imaging in 6 months' time. They are going to [place] and will return in April, so we will repeat MRI then.

## 2020-10-19 NOTE — REASON FOR VISIT
[Follow-Up: _____] : a [unfilled] follow-up visit [Home] : at home, [unfilled] , at the time of the visit. [Spouse] : spouse [Medical Office: ([facility])___] : at the medical office located in  [Verbal consent obtained from patient] : the patient, [unfilled] [FreeTextEntry1] : brain tumor

## 2020-10-19 NOTE — HISTORY OF PRESENT ILLNESS
[Verbal consent obtained from patient] : the patient, [unfilled] [Medical Office: ([facility])___] : at the medical office located in  [Home] : at home, [unfilled] , at the time of the visit. [FreeTextEntry1] : 46 yo LH woman with RIGHT TEMPORAL anaplastic astrocytoma (IDH mutant, MGMT methylated, no EGFR overexpression, no FGFR mutation, no EGFRvIII mutation, H3F3 status unknown, BRAF unknown) here for follow-up with new MRI. \par \par FHx: her mother is also left handed.\par \par Clinical course: \par presented with a seizure\par 12/10/2014 - s/p resection anterior portion STR of tumor\par 1/5/2015 - s/p re-resection posterior portion near GTR of tumor (discharged home on 1/28/2015)\par PATH - AA no necrosis no vascular proliferation, Ki67 as high as 21%.\par perioperative corona radiata CVA - no vessel abnormalities on MRA/CTA\par s/p RT (2/3/15-3/20/15) - unclear if concurrent temozolomide given\par s/p TMZ x 6 cycles (first last October 2015)\par stable disease since then, followed by MRI monitoring\par \par Her clinical course has been complicated by overweight, 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 She has used keppra and dilantin in the past. Her only seizure was around the time of surgical resection.\par \par Her MR imaging studies have been stable in the recent past. There is a large right temporal resection cavity described, together with minimal ree-resectional posterior and medial linear enhancement in a tumor that did not enhance upon presentation. non-enhancing disease is seen on T2 FLAIR infiltrating the splenium. I note there was modest thalamic T2 disease noted on some of the imaging. \par \par She presents today for routine follow-up with new MRI. She has no complaints and is feeling great. \par

## 2020-10-19 NOTE — DATA REVIEWED
[de-identified] : I personally reviewed neuroimaging dated\par 10/19/2020	6/16/2020		\par \par In reviewing these images, I find STABLE LARGE RIGHT TEMPORAL RESECTION WITH SURROUNDING MILD HYPERINTENSITY on the T2 weighted images, with signal change likely representing an admixture of treatment effects, cerebral edema, or neoplasm. \par On the contrast enhanced images, there is NO ABNORMAL enhancement.\par DWI imaging shows no acute CVA.\par Overall I find the images to be stable. \par Selected imaging was provided to the patient, along with a detailed verbal explanation of the issues at hand as outlined above.\par

## 2020-10-26 DIAGNOSIS — C71.9 MALIGNANT NEOPLASM OF BRAIN, UNSPECIFIED: ICD-10-CM

## 2020-12-02 ENCOUNTER — NON-APPOINTMENT (OUTPATIENT)
Age: 45
End: 2020-12-02

## 2021-04-23 ENCOUNTER — RESULT REVIEW (OUTPATIENT)
Age: 46
End: 2021-04-23

## 2021-04-23 ENCOUNTER — APPOINTMENT (OUTPATIENT)
Dept: MRI IMAGING | Facility: IMAGING CENTER | Age: 46
End: 2021-04-23
Payer: COMMERCIAL

## 2021-04-23 ENCOUNTER — OUTPATIENT (OUTPATIENT)
Dept: OUTPATIENT SERVICES | Facility: HOSPITAL | Age: 46
LOS: 1 days | End: 2021-04-23
Payer: COMMERCIAL

## 2021-04-23 ENCOUNTER — APPOINTMENT (OUTPATIENT)
Dept: NEUROLOGY | Facility: CLINIC | Age: 46
End: 2021-04-23
Payer: COMMERCIAL

## 2021-04-23 DIAGNOSIS — C71.2 MALIGNANT NEOPLASM OF TEMPORAL LOBE: Chronic | ICD-10-CM

## 2021-04-23 DIAGNOSIS — C71.9 MALIGNANT NEOPLASM OF BRAIN, UNSPECIFIED: ICD-10-CM

## 2021-04-23 PROCEDURE — 70553 MRI BRAIN STEM W/O & W/DYE: CPT

## 2021-04-23 PROCEDURE — 70553 MRI BRAIN STEM W/O & W/DYE: CPT | Mod: 26

## 2021-04-23 PROCEDURE — A9585: CPT

## 2021-04-23 PROCEDURE — 99072 ADDL SUPL MATRL&STAF TM PHE: CPT

## 2021-04-23 PROCEDURE — 99213 OFFICE O/P EST LOW 20 MIN: CPT

## 2021-04-23 NOTE — PHYSICAL EXAM
[General Appearance - Alert] : alert [General Appearance - In No Acute Distress] : in no acute distress [Oriented To Time, Place, And Person] : oriented to person, place, and time [Impaired Insight] : insight and judgment were intact [Affect] : the affect was normal [Person] : oriented to person [Place] : oriented to place [Time] : oriented to time [Concentration Intact] : normal concentrating ability [Visual Intact] : visual attention was ~T not ~L decreased [Naming Objects] : no difficulty naming common objects [Repeating Phrases] : no difficulty repeating a phrase [Writing A Sentence] : no difficulty writing a sentence [Fluency] : fluency intact [Comprehension] : comprehension intact [Reading] : reading intact [Past History] : adequate knowledge of personal past history [Cranial Nerves Optic (II)] : visual acuity intact bilaterally,  visual fields full to confrontation, pupils equal round and reactive to light [Cranial Nerves Oculomotor (III)] : extraocular motion intact [Cranial Nerves Trigeminal (V)] : facial sensation intact symmetrically [Cranial Nerves Facial (VII)] : face symmetrical [Cranial Nerves Vestibulocochlear (VIII)] : hearing was intact bilaterally [Cranial Nerves Glossopharyngeal (IX)] : tongue and palate midline [Cranial Nerves Accessory (XI - Cranial And Spinal)] : head turning and shoulder shrug symmetric [Cranial Nerves Hypoglossal (XII)] : there was no tongue deviation with protrusion [Motor Tone] : muscle tone was normal in all four extremities [Motor Strength] : muscle strength was normal in all four extremities [No Muscle Atrophy] : normal bulk in all four extremities [Motor Handedness Left-Handed] : the patient is left hand dominant [Sensation Tactile Decrease] : light touch was intact [Abnormal Walk] : normal gait [Balance] : balance was intact [2+] : Ankle jerk left 2+ [Paresis Pronator Drift Right-Sided] : no pronator drift on the right [Paresis Pronator Drift Left-Sided] : no pronator drift on the left [Motor Strength Upper Extremities Bilaterally] : strength was normal in both upper extremities [Motor Strength Lower Extremities Bilaterally] : strength was normal in both lower extremities [Past-pointing] : there was no past-pointing [Tremor] : no tremor present [Plantar Reflex Right Only] : normal on the right [Plantar Reflex Left Only] : normal on the left

## 2021-04-23 NOTE — DISCUSSION/SUMMARY
[FreeTextEntry1] : MALIGNANT BRAIN TUMOR -- No active malignancy that chemotherapy would treat at this time. \par \par DISABILITY -- In addition to her universally fatal disease, she has significant memory loss and challenges ambulating. She has made remarkable use of her non-dominant (right) hand following the perioperative cerebrovascular infarction.\par \par DISPO -- To repeat imaging annually. She will return sooner as needed.

## 2021-04-23 NOTE — HISTORY OF PRESENT ILLNESS
[FreeTextEntry1] : 45 yo LH woman with RIGHT TEMPORAL anaplastic astrocytoma (IDH mutant, MGMT methylated, no EGFR overexpression, no FGFR mutation, no EGFRvIII mutation, H3F3 status unknown, BRAF unknown) here for an opinion regarding managing her malignant brain tumor.\par \par FHx: her mother is also left handed.\par \par presented with a seizure\par 12/9/2014 - s/p resection anterior portion STR of tumor\par 1/5/2015 - s/p re-resection posterior portion near GTR of tumor (discharged home on 1/28/2015)\par PATH - AA no necrosis no vascular proliferation, Ki67 as high as 21%.\par perioperative corona radiata CVA - no vessel abnormalities on MRA/CTA\par 2/3/15-3/20/15 - s/p RT unclear if concurrent temozolomide given\par s/p TMZ x 6 cycles (last October 2015)\par stable disease since then (last saw Clive/neuro-onc) about 6 months ago.\par 6/3/2020 - changed care to [facility].\par \par Her clinical course has been complicated by height of 5'4 and weight of 170 pounds, 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 She has used keppra and dilantin in the past. Her only seizure was around the time of surgical resection.\par \par Her MR imaging studies have been stable in the recent past. There is a large right temporal resection cavity described, together with minimal ree-resectional posterior and medial linear enhancement in a tumor that did not enhance upon presentation. non-enhancing disease is seen on T2 FLAIR infiltrating the splenium. I note there was modest thalamic T2 disease noted on some of the imaging. \par \par She returns today with a new brain MRI. She continues to suffer memory deficits related to her left handedness and the medial right temporal location of her tumor involving major memory centers. She arrives with her parents. She is not driving. She is unable to work.\par \par She denies headache, nausea/vomiting, visual changes or any other signs of increased ICP. No new seizure-like symptomatology. There are no other cranial nerve complaints: no double-vision, no blurry vision, no facial asymmetry, no trouble swallowing, no hearing loss.

## 2021-04-23 NOTE — DATA REVIEWED
[de-identified] : I personally reviewed neuroimaging dated\par 11/8/2018	10/16/2019	6/16/2020	10/19/2020\par 4/23/2021			\par \par In reviewing these images, I find STABLE RIGHT ANTERIOR TEMPORAL HYPERINTENSITY AROUND A LARGE RESECTION CAVITY on the T2 weighted images, with signal change likely representing an admixture of treatment effects, cerebral edema, or neoplasm. \par THERE IS EX VACUO DILATATION OF THE RIGHT LATERAL VENTRICLE.\par On the contrast enhanced images, there is NO NEOPLASTIC enhancement.\par DWI imaging shows no acute CVA.\par Overall I find the images to be stable \par Selected imaging was provided to the patient, along with a detailed verbal explanation of the issues at hand as outlined above.\par

## 2021-11-04 NOTE — PROGRESS NOTE ADULT - SUBJECTIVE AND OBJECTIVE BOX
After pharmacist chart review, the following recommendations are made:    -Michael fox has a copay card available for commercially insured patients- may reduce copay to $10    For Pharmacy Admin Tracking Only   CPA in place:  No   Intervention Detail: Patient Access Assistance/Sample Provided   Total # of Interventions Recommended: 1   Total # of Interventions Accepted: 1   Time Spent (min): [address] ([address]) [place], PharmD, [facility]  Internal Medicine Clinical Pharmacist  [phone] CC/Reason for Consult:     SUBJECTIVE / OVERNIGHT EVENTS:    MEDICATIONS  (STANDING):  ARIPiprazole 30milliGRAM(s) Oral daily  lactobacillus acidophilus 1Tablet(s) Oral two times a day with meals  pantoprazole    Tablet 40milliGRAM(s) Oral before breakfast  FLUoxetine 40milliGRAM(s) Oral daily  atorvastatin 10milliGRAM(s) Oral at bedtime  docusate sodium 100milliGRAM(s) Oral two times a day  ALPRAZolam 1milliGRAM(s) Oral two times a day  cyanocobalamin Injectable 1000MICROGram(s) IntraMuscular <User Schedule>  topiramate 100milliGRAM(s) Oral at bedtime  topiramate 125milliGRAM(s) Oral daily  OXcarbazepine 150milliGRAM(s) Oral two times a day  cholecalciferol 1000Unit(s) Oral daily    MEDICATIONS  (PRN):  haloperidol    Injectable 5milliGRAM(s) IntraMuscular once PRN severe agitation, disorganization  senna 1Tablet(s) Oral every 6 hours PRN Constipation  LORazepam     Tablet 1milliGRAM(s) Oral every 6 hours PRN anxiety, restlessness, combative behaviors.  LORazepam   Injectable 2milliGRAM(s) IntraMuscular once PRN severe anxiety, agitation, aggression      Vital Signs Last 24 Hrs  T(C): 36.6, Max: 36.6 (06-23 @ 06:59)  HR: 90  BP: 115/81  RR: 16      PHYSICAL EXAM:  GENERAL: NAD, well-developed  HEAD:  Atraumatic, Normocephalic  EYES: EOMI, PERRLA, conjunctiva and sclera clear  NECK: Supple, No JVD  CHEST/LUNG: Clear to auscultation bilaterally; No wheeze  HEART: Regular rate and rhythm; No murmurs, rubs, or gallops  ABDOMEN: Soft, Nontender, Nondistended; Bowel sounds present  EXTREMITIES:  2+ Peripheral Pulses, No clubbing, cyanosis, or edema  PSYCH: AAOx3  NEUROLOGY: non-focal  SKIN: No rashes or lesions    LABS:  Vitamin D, 25-Hydroxy (06.20.17 @ 08:20)    Vitamin D, 25-Hydroxy: 16.8ng/mL  Vitamin B12, Serum in AM (06.15.17 @ 05:10)    Vitamin B12, Serum: 203 pg/mL      Care Discussed with Consultants/Other Providers: MILO Amaya

## 2021-12-08 NOTE — ED ADULT NURSE REASSESSMENT NOTE - NS ED NURSE REASSESS COMMENT FT1
"Last Written Prescription Date:  10/1/2020  Last Fill Quantity: 90,  # refills: 3   Last office visit provider:  7/26/21     Requested Prescriptions   Pending Prescriptions Disp Refills     atenolol (TENORMIN) 50 MG tablet [Pharmacy Med Name: ATENOLOL 50MG TABLETS] 90 tablet 3     Sig: TAKE 1 TABLET(50 MG) BY MOUTH DAILY       Beta-Blockers Protocol Passed - 12/7/2021 10:10 AM        Passed - Blood pressure under 140/90 in past 12 months     BP Readings from Last 3 Encounters:   07/26/21 136/78   05/17/21 138/82   04/26/21 (!) 150/84                 Passed - Patient is age 6 or older        Passed - Recent (12 mo) or future (30 days) visit within the authorizing provider's specialty     Patient has had an office visit with the authorizing provider or a provider within the authorizing providers department within the previous 12 mos or has a future within next 30 days. See \"Patient Info\" tab in inbasket, or \"Choose Columns\" in Meds & Orders section of the refill encounter.              Passed - Medication is active on med list             Sonja Rebolledo RN 12/08/21 1:34 PM  " HR 87, B/P 113/65, R16, 100% on RA. Patient denies any pain currently. Appears in nad. Respirations even and unlabored. Will monitor. HR 87, B/P 113/65, R16, 100% on RA. Patient denies any pain currently. Appears in nad. Respirations even and unlabored. 20G IV Placed in right hand. Will monitor.

## 2021-12-20 NOTE — PROGRESS NOTE BEHAVIORAL HEALTH - NS ED [facility] MED ROS RESPIRATORY
Patient called stating that she is going out of the country [place] 16 [place]. She just wanted to let you know that she doesn't know how her appointment was canceled because she didn't do it. She just wanted to make you aware that she won't see you again for 2 years. Her appointment was for 12/22/2021 but was canceled via automated phone reminder.     Please Advise       No complaints

## 2022-04-27 ENCOUNTER — TRANSCRIPTION ENCOUNTER (OUTPATIENT)
Age: 47
End: 2022-04-27

## 2022-05-08 ENCOUNTER — NON-APPOINTMENT (OUTPATIENT)
Age: 47
End: 2022-05-08

## 2022-05-19 ENCOUNTER — APPOINTMENT (OUTPATIENT)
Dept: NEUROLOGY | Facility: CLINIC | Age: 47
End: 2022-05-19
Payer: COMMERCIAL

## 2022-05-19 ENCOUNTER — APPOINTMENT (OUTPATIENT)
Dept: MRI IMAGING | Facility: IMAGING CENTER | Age: 47
End: 2022-05-19
Payer: COMMERCIAL

## 2022-05-19 ENCOUNTER — OUTPATIENT (OUTPATIENT)
Dept: OUTPATIENT SERVICES | Facility: HOSPITAL | Age: 47
LOS: 1 days | End: 2022-05-19
Payer: COMMERCIAL

## 2022-05-19 VITALS — HEART RATE: 76 BPM | OXYGEN SATURATION: 94 % | RESPIRATION RATE: 16 BRPM

## 2022-05-19 DIAGNOSIS — C71.9 MALIGNANT NEOPLASM OF BRAIN, UNSPECIFIED: ICD-10-CM

## 2022-05-19 DIAGNOSIS — Z00.8 ENCOUNTER FOR OTHER GENERAL EXAMINATION: ICD-10-CM

## 2022-05-19 DIAGNOSIS — C71.2 MALIGNANT NEOPLASM OF TEMPORAL LOBE: Chronic | ICD-10-CM

## 2022-05-19 PROCEDURE — 70553 MRI BRAIN STEM W/O & W/DYE: CPT

## 2022-05-19 PROCEDURE — A9585: CPT

## 2022-05-19 PROCEDURE — 70553 MRI BRAIN STEM W/O & W/DYE: CPT | Mod: 26

## 2022-05-19 PROCEDURE — 99215 OFFICE O/P EST HI 40 MIN: CPT

## 2022-05-19 RX ORDER — ARIPIPRAZOLE 5 MG/1
5 TABLET ORAL
Refills: 0 | Status: ACTIVE | COMMUNITY
Start: 2022-05-19

## 2022-05-19 RX ORDER — FLUOXETINE HYDROCHLORIDE 10 MG/1
10 CAPSULE ORAL
Refills: 0 | Status: ACTIVE | COMMUNITY
Start: 2022-05-19

## 2022-05-19 NOTE — PHYSICAL EXAM
[General Appearance - Alert] : alert [General Appearance - In No Acute Distress] : in no acute distress [Oriented To Time, Place, And Person] : oriented to person, place, and time [Impaired Insight] : insight and judgment were intact [Affect] : the affect was normal [Mood] : the mood was normal [Person] : oriented to person [Place] : oriented to place [Time] : oriented to time [Remote Intact] : remote memory intact [Span Intact] : the attention span was normal [Concentration Intact] : normal concentrating ability [Visual Intact] : visual attention was ~T not ~L decreased [Naming Objects] : no difficulty naming common objects [Repeating Phrases] : no difficulty repeating a phrase [Fluency] : fluency intact [Comprehension] : comprehension intact [Reading] : reading intact [Past History] : adequate knowledge of personal past history [Cranial Nerves Oculomotor (III)] : extraocular motion intact [Cranial Nerves Trigeminal (V)] : facial sensation intact symmetrically [Cranial Nerves Facial (VII)] : face symmetrical [Cranial Nerves Vestibulocochlear (VIII)] : hearing was intact bilaterally [Cranial Nerves Glossopharyngeal (IX)] : tongue and palate midline [Cranial Nerves Accessory (XI - Cranial And Spinal)] : head turning and shoulder shrug symmetric [Cranial Nerves Hypoglossal (XII)] : there was no tongue deviation with protrusion [Motor Tone] : muscle tone was normal in all four extremities [Motor Strength] : muscle strength was normal in all four extremities [Involuntary Movements] : no involuntary movements were seen [No Muscle Atrophy] : normal bulk in all four extremities [Paresis Pronator Drift Left-Sided] : a left-sided pronator drift was present [Sensation Tactile Decrease] : light touch was intact [Sclera] : the sclera and conjunctiva were normal [Extraocular Movements] : extraocular movements were intact [Respiration, Rhythm And Depth] : normal respiratory rhythm and effort [Edema] : there was no peripheral edema [Limited Balance] : the patient's balance was impaired [Past-pointing] : there was no past-pointing [Tremor] : no tremor present [Dysdiadochokinesia Bilaterally] : not present [Coordination - Dysmetria Impaired Finger-to-Nose Bilateral] : not present [FreeTextEntry5] : left "pie in the fantasma" VF cut

## 2022-05-19 NOTE — DATA REVIEWED
[de-identified] : I personally reviewed and interpreted MR imaging dated\par 4/19/2018	4/25/2019	4/23/2021	5/19/2022\par \par In reviewing these images, I find RIGHT TEMPORAL HYPERINTENSITY on the T2 weighted images AROUND THE RESECTION CAVITY AND EXTENDING TO THE POSTERIOR TEMPORAL LOBE WHITE MATTER, with signal change likely representing an admixture of treatment effects AND RESIDUAL cerebral edema. \par On the contrast enhanced images, there is MINIMAL POST-OPERATIVE enhancement AROUND THE RIGHT TEMPORAL LOBE’S EXTENSIVE RESECTION CAVITY.\par There is no infiltration of the splenium of the callosum.\par Overall I find the images to be stable.\par Selected imaging was provided to the patient, along with a detailed verbal explanation of the issues at hand as outlined above.\par

## 2022-05-19 NOTE — DATA REVIEWED
[de-identified] : I personally reviewed and interpreted MR imaging dated\par 4/19/2018	4/25/2019	4/23/2021	5/19/2022\par \par In reviewing these images, I find RIGHT TEMPORAL HYPERINTENSITY on the T2 weighted images AROUND THE RESECTION CAVITY AND EXTENDING TO THE POSTERIOR TEMPORAL LOBE WHITE MATTER, with signal change likely representing an admixture of treatment effects AND RESIDUAL cerebral edema. \par On the contrast enhanced images, there is MINIMAL POST-OPERATIVE enhancement AROUND THE RIGHT TEMPORAL LOBE’S EXTENSIVE RESECTION CAVITY.\par There is no infiltration of the splenium of the callosum.\par Overall I find the images to be stable.\par Selected imaging was provided to the patient, along with a detailed verbal explanation of the issues at hand as outlined above.\par

## 2022-05-19 NOTE — DISCUSSION/SUMMARY
[FreeTextEntry1] : IDH MUTANT AA -- Imaging reviewed and she is doing well clinically, despite persistent memory issues and imbalance. Disease controlled at present. It will probably recur eventually and prove fatal.\par \par SEIZURES -- None on current regimen.\par \par DISPO -- To return in one year with repeat brain MRI with contrast, sooner if new neurologic problems arise.

## 2022-05-19 NOTE — HISTORY OF PRESENT ILLNESS
[FreeTextEntry1] : 46 yo LH woman with RIGHT TEMPORAL anaplastic astrocytoma (IDH mutant, MGMT methylated, no EGFR overexpression, no FGFR mutation, no EGFRvIII mutation, H3F3 status unknown, BRAF unknown) here for follow-up visit. \par \par FHx: her mother is also left handed.\par \par ONCOLOGIC HISTORY\par presented with a seizure\par 12/9/2014 - s/p resection anterior portion STR of tumor ([facility])\par 1/5/2015 - s/p re-resection posterior portion near GTR of tumor (discharged home on 1/28/2015)\par PATH - AA no necrosis no vascular proliferation, Ki67 as high as 21%.\par perioperative corona radiata CVA - no vessel abnormalities on MRA/CTA\par 2/3/15-3/20/15 - s/p RT unclear if concurrent temozolomide given\par s/p TMZ x 6 cycles (last October 2015)\par 2020- stable disease at DR LUND (neuro-onc) office \par 6/3/2020 - changed care to [facility].\par 12/2/2020 - doing well, no POD\par 4/23/2021 - last seen by ADELAIDA\par \par Her clinical course has been complicated by height of 5'4 and weight of 170 pounds, chronic headaches (? Fioricet overuse syndrome), drug rash (? 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 (keppra and dilantin in the past) Her only seizure was around the time of surgical resection.\par  \par She presents today for follow-up with new MRI. \par She denies headache, nausea/vomiting, visual changes or any other signs of increased ICP. No new seizure-like symptomatology. There are no other cranial nerve complaints: no double-vision, no blurry vision, no facial asymmetry, no trouble swallowing, no hearing loss. 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 \par Her gait is unsteady and she had a few recent falls.

## 2022-05-19 NOTE — HISTORY OF PRESENT ILLNESS
[FreeTextEntry1] : 48 yo LH woman with RIGHT TEMPORAL anaplastic astrocytoma (IDH mutant, MGMT methylated, no EGFR overexpression, no FGFR mutation, no EGFRvIII mutation, H3F3 status unknown, BRAF unknown) here for follow-up visit. \par \par FHx: her mother is also left handed.\par \par ONCOLOGIC HISTORY\par presented with a seizure\par 12/9/2014 - s/p resection anterior portion STR of tumor ([facility])\par 1/5/2015 - s/p re-resection posterior portion near GTR of tumor (discharged home on 1/28/2015)\par PATH - AA no necrosis no vascular proliferation, Ki67 as high as 21%.\par perioperative corona radiata CVA - no vessel abnormalities on MRA/CTA\par 2/3/15-3/20/15 - s/p RT unclear if concurrent temozolomide given\par s/p TMZ x 6 cycles (last October 2015)\par 2020- stable disease at DR LUND (neuro-onc) office \par 6/3/2020 - changed care to [facility].\par 12/2/2020 - doing well, no POD\par 4/23/2021 - last seen by ADELAIDA\par \par Her clinical course has been complicated by height of 5'4 and weight of 170 pounds, chronic headaches (? Fioricet overuse syndrome), drug rash (? 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 (keppra and dilantin in the past) Her only seizure was around the time of surgical resection.\par  \par She presents today for follow-up with new MRI. \par She denies headache, nausea/vomiting, visual changes or any other signs of increased ICP. No new seizure-like symptomatology. There are no other cranial nerve complaints: no double-vision, no blurry vision, no facial asymmetry, no trouble swallowing, no hearing loss. 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 \par Her gait is unsteady and she had a few recent falls.

## 2022-07-22 ENCOUNTER — NON-APPOINTMENT (OUTPATIENT)
Age: 47
End: 2022-07-22

## 2022-08-12 ENCOUNTER — NON-APPOINTMENT (OUTPATIENT)
Age: 47
End: 2022-08-12

## 2022-08-24 NOTE — BEHAVIORAL HEALTH ASSESSMENT NOTE - EMPLOYMENT
Other [facility]:  I discussed with the patient the risks of golimumab including but not limited to myelosuppression, immunosuppression, autoimmune hepatitis, demyelinating diseases, lymphoma, and serious infections.  The patient understands that monitoring is required including a PPD at baseline and must alert us or the primary physician if symptoms of infection or other concerning signs are noted.

## 2022-09-28 ENCOUNTER — NON-APPOINTMENT (OUTPATIENT)
Age: 47
End: 2022-09-28

## 2022-09-29 ENCOUNTER — EMERGENCY (EMERGENCY)
Facility: HOSPITAL | Age: 47
LOS: 1 days | Discharge: HOME | End: 2022-09-29
Attending: EMERGENCY MEDICINE
Payer: COMMERCIAL

## 2022-09-29 VITALS
HEIGHT: 65 IN | RESPIRATION RATE: 19 BRPM | TEMPERATURE: 98 F | HEART RATE: 54 BPM | OXYGEN SATURATION: 98 % | WEIGHT: 210.1 LBS | SYSTOLIC BLOOD PRESSURE: 163 MMHG | DIASTOLIC BLOOD PRESSURE: 98 MMHG

## 2022-09-29 DIAGNOSIS — C71.2 MALIGNANT NEOPLASM OF TEMPORAL LOBE: Chronic | ICD-10-CM

## 2022-09-29 LAB
ALBUMIN SERPL ELPH-MCNC: 4.8 G/DL — SIGNIFICANT CHANGE UP (ref 3.3–5)
ALP SERPL-CCNC: 87 U/L — SIGNIFICANT CHANGE UP (ref 40–120)
ALT FLD-CCNC: 11 U/L — SIGNIFICANT CHANGE UP (ref 10–45)
ANION GAP SERPL CALC-SCNC: 14 MMOL/L — SIGNIFICANT CHANGE UP (ref 5–17)
AST SERPL-CCNC: 19 U/L — SIGNIFICANT CHANGE UP (ref 10–40)
BASE EXCESS BLDV CALC-SCNC: -2.2 MMOL/L — LOW (ref -2–3)
BASOPHILS # BLD AUTO: 0.05 K/UL — SIGNIFICANT CHANGE UP (ref 0–0.2)
BASOPHILS NFR BLD AUTO: 0.2 % — SIGNIFICANT CHANGE UP (ref 0–2)
BILIRUB SERPL-MCNC: 0.3 MG/DL — SIGNIFICANT CHANGE UP (ref 0.2–1.2)
BUN SERPL-MCNC: 18 MG/DL — SIGNIFICANT CHANGE UP (ref 7–23)
CA-I SERPL-SCNC: 1.22 MMOL/L — SIGNIFICANT CHANGE UP (ref 1.15–1.33)
CALCIUM SERPL-MCNC: 9.6 MG/DL — SIGNIFICANT CHANGE UP (ref 8.4–10.5)
CHLORIDE BLDV-SCNC: 107 MMOL/L — SIGNIFICANT CHANGE UP (ref 96–108)
CHLORIDE SERPL-SCNC: 106 MMOL/L — SIGNIFICANT CHANGE UP (ref 96–108)
CO2 BLDV-SCNC: 27 MMOL/L — HIGH (ref 22–26)
CO2 SERPL-SCNC: 23 MMOL/L — SIGNIFICANT CHANGE UP (ref 22–31)
CREAT SERPL-MCNC: 1.26 MG/DL — SIGNIFICANT CHANGE UP (ref 0.5–1.3)
EGFR: 53 ML/MIN/1.73M2 — LOW
EOSINOPHIL # BLD AUTO: 0.01 K/UL — SIGNIFICANT CHANGE UP (ref 0–0.5)
EOSINOPHIL NFR BLD AUTO: 0 % — SIGNIFICANT CHANGE UP (ref 0–6)
GAS PNL BLDV: 138 MMOL/L — SIGNIFICANT CHANGE UP (ref 136–145)
GAS PNL BLDV: SIGNIFICANT CHANGE UP
GAS PNL BLDV: SIGNIFICANT CHANGE UP
GLUCOSE BLDV-MCNC: 155 MG/DL — HIGH (ref 70–99)
GLUCOSE SERPL-MCNC: 159 MG/DL — HIGH (ref 70–99)
HCO3 BLDV-SCNC: 26 MMOL/L — SIGNIFICANT CHANGE UP (ref 22–29)
HCT VFR BLD CALC: 39.4 % — SIGNIFICANT CHANGE UP (ref 34.5–45)
HCT VFR BLDA CALC: 41 % — SIGNIFICANT CHANGE UP (ref 34.5–46.5)
HGB BLD CALC-MCNC: 13.8 G/DL — SIGNIFICANT CHANGE UP (ref 11.7–16.1)
HGB BLD-MCNC: 13.3 G/DL — SIGNIFICANT CHANGE UP (ref 11.5–15.5)
IMM GRANULOCYTES NFR BLD AUTO: 0.6 % — SIGNIFICANT CHANGE UP (ref 0–0.9)
LACTATE BLDV-MCNC: 1.3 MMOL/L — SIGNIFICANT CHANGE UP (ref 0.5–2)
LIDOCAIN IGE QN: 15 U/L — SIGNIFICANT CHANGE UP (ref 7–60)
LYMPHOCYTES # BLD AUTO: 1.23 K/UL — SIGNIFICANT CHANGE UP (ref 1–3.3)
LYMPHOCYTES # BLD AUTO: 5.8 % — LOW (ref 13–44)
MCHC RBC-ENTMCNC: 29.1 PG — SIGNIFICANT CHANGE UP (ref 27–34)
MCHC RBC-ENTMCNC: 33.8 GM/DL — SIGNIFICANT CHANGE UP (ref 32–36)
MCV RBC AUTO: 86.2 FL — SIGNIFICANT CHANGE UP (ref 80–100)
MONOCYTES # BLD AUTO: 0.69 K/UL — SIGNIFICANT CHANGE UP (ref 0–0.9)
MONOCYTES NFR BLD AUTO: 3.3 % — SIGNIFICANT CHANGE UP (ref 2–14)
NEUTROPHILS # BLD AUTO: 18.98 K/UL — HIGH (ref 1.8–7.4)
NEUTROPHILS NFR BLD AUTO: 90.1 % — HIGH (ref 43–77)
NRBC # BLD: 0 /100 WBCS — SIGNIFICANT CHANGE UP (ref 0–0)
PCO2 BLDV: 56 MMHG — HIGH (ref 39–42)
PH BLDV: 7.27 — LOW (ref 7.32–7.43)
PLATELET # BLD AUTO: 195 K/UL — SIGNIFICANT CHANGE UP (ref 150–400)
PO2 BLDV: 19 MMHG — LOW (ref 25–45)
POTASSIUM BLDV-SCNC: 4.4 MMOL/L — SIGNIFICANT CHANGE UP (ref 3.5–5.1)
POTASSIUM SERPL-MCNC: 3.9 MMOL/L — SIGNIFICANT CHANGE UP (ref 3.5–5.3)
POTASSIUM SERPL-SCNC: 3.9 MMOL/L — SIGNIFICANT CHANGE UP (ref 3.5–5.3)
PROT SERPL-MCNC: 7.6 G/DL — SIGNIFICANT CHANGE UP (ref 6–8.3)
RBC # BLD: 4.57 M/UL — SIGNIFICANT CHANGE UP (ref 3.8–5.2)
RBC # FLD: 12.7 % — SIGNIFICANT CHANGE UP (ref 10.3–14.5)
SAO2 % BLDV: 24.4 % — LOW (ref 67–88)
SODIUM SERPL-SCNC: 143 MMOL/L — SIGNIFICANT CHANGE UP (ref 135–145)
WBC # BLD: 21.08 K/UL — HIGH (ref 3.8–10.5)
WBC # FLD AUTO: 21.08 K/UL — HIGH (ref 3.8–10.5)

## 2022-09-29 PROCEDURE — 74177 CT ABD & PELVIS W/CONTRAST: CPT | Mod: 26,MA

## 2022-09-29 PROCEDURE — 99285 EMERGENCY DEPT VISIT HI MDM: CPT

## 2022-09-29 PROCEDURE — 93010 ELECTROCARDIOGRAM REPORT: CPT | Mod: NC

## 2022-09-29 RX ORDER — ONDANSETRON 8 MG/1
4 TABLET, FILM COATED ORAL ONCE
Refills: 0 | Status: COMPLETED | OUTPATIENT
Start: 2022-09-29 | End: 2022-09-29

## 2022-09-29 RX ORDER — SODIUM CHLORIDE 9 MG/ML
1000 INJECTION, SOLUTION INTRAVENOUS ONCE
Refills: 0 | Status: COMPLETED | OUTPATIENT
Start: 2022-09-29 | End: 2022-09-29

## 2022-09-29 RX ORDER — ACETAMINOPHEN 500 MG
1000 TABLET ORAL ONCE
Refills: 0 | Status: COMPLETED | OUTPATIENT
Start: 2022-09-29 | End: 2022-09-29

## 2022-09-29 RX ADMIN — Medication 400 MILLIGRAM(S): at 21:57

## 2022-09-29 RX ADMIN — ONDANSETRON 4 MILLIGRAM(S): 8 TABLET, FILM COATED ORAL at 19:56

## 2022-09-29 RX ADMIN — SODIUM CHLORIDE 1000 MILLILITER(S): 9 INJECTION, SOLUTION INTRAVENOUS at 21:57

## 2022-09-29 NOTE — ED PROVIDER NOTE - RAPID ASSESSMENT
47 F with PMHx of brain CA presents to the ED c/o suprapubic abd pain, emesis, hematuria, and diarrhea x few days. Denies diarrhea today. Pt went to UC, and was told she had hematuria and was referred to ED for further evaluation. Reports no AC or abd surgeries. Allergic to Penicillin. Pt is well appearing.    **Pt seen in the waiting room via teletriage by Mallory Dasilva), documentation completed by Gus Beal. Pt to be sent to main ED for further evaluation - all orders placed to be followed by MD in the main ED** 47 F with PMHx of brain CA presents to the ED c/o suprapubic abd pain, emesis, hematuria, and diarrhea x few days. Denies diarrhea today. Pt went to UC, and was told she had hematuria and was referred to ED for further evaluation. Reports no AC or abd surgeries. Allergic to Penicillin. Pt is well appearing.    **Pt seen in the waiting room via teletriage by Mallory Dasilva (PA), documentation completed by Gus Beal. Pt to be sent to main ED for further evaluation - all orders placed to be followed by MD in the main ED**    Mallory BROWN PA-C, personally performed the service described in the documentation recorded by the scribe in my presence, and it accurately and completely records my words and actions. Pt to receive full H&P in Main ER.

## 2022-09-29 NOTE — ED PROVIDER NOTE - CLINICAL SUMMARY MEDICAL DECISION MAKING FREE TEXT BOX
Patient is a 47y female who presents to the ED with abdominal pain and hematuria. Patient is stable and non septic appearing at time of presentation. We will obtain basic lab work to rule out infectious etiology. EKG will be obtained given patients age. Hematuria source will be evaluated with UA and UC. Patient will undergo CT scan to r.o abdominal pathologies. Patient is a 47y female who presents to the ED with abdominal pain and hematuria. Patient is stable and non septic appearing at time of presentation. We will obtain basic lab work to rule out infectious etiology. EKG will be obtained given patients age. Hematuria source will be evaluated with UA and UC. Patient will undergo CT scan to r.o abdominal pathologies.    CT showed: Mild to moderate left hydroureteronephrosis secondary to a 3 mm calculus   at the left ureterovesicular junction. U/A neg for infection, culture pending.

## 2022-09-29 NOTE — ED PROVIDER NOTE - IV ALTEPLASE DOOR HIDDEN
9/14/2021    Patient: Morris Giron   YOB: 1956   Date of Visit: 9/14/2021     [place]htemoc Kennedy [place]  [address]  [facility] [address]  Via Fax: [phone]    Dear Cuauhtemoc Kennedy MD,      Thank you for referring Ms. Cruz Officer to [facility] for evaluation. My notes for this consultation are attached. If you have questions, please do not hesitate to call me. I look forward to following your patient along with you.       Sincerely,    Marbella Gómez MD
show

## 2022-09-29 NOTE — ED PROVIDER NOTE - NSPTACCESSSVCSAPPTDETAILS_ED_ALL_ED_FT
Please follow up with urology immediately to discuss CT scan showing kidney stone.    Mild to moderate left hydroureteronephrosis secondary to a 3 mm calculus   at the left ureterovesicular junction.

## 2022-09-29 NOTE — ED PROVIDER NOTE - PHYSICAL EXAMINATION
Physical Exam:  Gen: NAD, AOx3, non-toxic appearing, able to ambulate without assistance  Head: NCAT  HEENT: EOMI, PEERLA, normal conjunctiva, tongue midline, oral mucosa moist  Lung: CTAB, no respiratory distress, no wheezes/rhonchi/rales B/L, speaking in full sentences  CV: RRR  Abd LUQ tenderness and suprapubic tenderness. + distention,  no guarding, no rebound tenderness, no CVA tenderness   MSK: no visible deformities, ROM normal in UE/LE, no back pain  Neuro: baseline pie in the fantasma vision loss, no new numbness or tingling   Skin: Warm, well perfused, no rash, no leg swelling

## 2022-09-29 NOTE — ED PROVIDER NOTE - OBJECTIVE STATEMENT
Patient is a 47y f with pmhx of Anaplastic astrocytoma s/p surgery and perioperative stroke (baseline pie in the fantasma and cognitive issues) who presents to the ed with suprapubic abdominal pain. Patient states she has had nausea, vomiting for a few days. Patient was taken the urgent care today where they discovered some hematuria-- was told to come into the ED for further evaluation. Patient denies any fever, syncope, numbness or tingling. Patient complains of chills, chronic cough (pulmonologist appt pending) and some shortness of breath.

## 2022-09-29 NOTE — ED PROVIDER NOTE - NSICDXPASTMEDICALHX_GEN_ALL_CORE_FT
PAST MEDICAL HISTORY:  Anxiety     Asthma never intubated or hospitalized for ot, uses symbicort and proventil    Astrocytoma Temporal lobe    Chronic sinusitis     Dislocation of shoulder x 4 -  2000 to 2005    GERD (gastroesophageal reflux disease)     Hypothyroidism     Obesity (BMI 30-39.9)     Spine fracture lumbar 2005

## 2022-09-29 NOTE — ED PROVIDER NOTE - PROGRESS NOTE DETAILS
CT shows: Mild to moderate left hydroureteronephrosis secondary to a 3 mm calculus at the left ureterovesicular junction.

## 2022-09-29 NOTE — ED ADULT NURSE NOTE - OBJECTIVE STATEMENT
Pt is an A&O4 47YOF who is here with suprapubic abdominal pain that radiates from the left side to the umbilicus, pt states that she had several bouts of diarrhea over the last few days but states that she has not had one today, pt states that she has nausea with vomiting, pt states that today she started to have hematuria, denies any fevers, chills, pt states she has a hx of brain CA. Pt denies any numbness or tingling.

## 2022-09-29 NOTE — ED PROVIDER NOTE - ATTENDING CONTRIBUTION TO CARE
Patient is a 46 yo F with history of brain CA (Anaplastic astrocytoma s/p surgery and perioperative stroke) here for suprapubic abdominal discomfort, hematuria and diarrhea. Patient also reports nausea and vomiting. She went to urgent care and was referred to the ED. She was told she had some hematuria. Patient denies travel, sick contacts. No fevers but she complains of chills.     VS noted  Gen. no acute distress, Non toxic   HEENT: EOMI, mmm  Lungs: CTAB/L no C/ W /R   CVS: RRR   Abd; Soft, suprapubic tenderness, no rebound or guarding  Ext: no edema  Skin: no rash  Neuro AAOx3 non focal clear speech  a/p: abd pain, hematuria, nausea, vomiting, diarrhea - differential includes colitis, cystitis, viral syndrome. Will check labs, u/a, CT A/P.   - Damon HICKEY

## 2022-09-29 NOTE — ED ADULT TRIAGE NOTE - BSA (M2)
[address] [place] [address] 
 
 
 [address] [facility] 
[phone] Patient: Zackary Camarillo MRN: [id number] ZEN:[id number] Visit Information Date & Time Provider Department Dept. Phone Encounter #  
 8/29/2018  2:30 PM IM[facility] Knott Arlin 14 [id number] Follow-up Instructions Return in about 1 month (around 9/29/2018) for Follow up ADHD. Your Appointments 9/18/2018  7:30 AM  
PHYSICAL PRE OP with Rose Mary Hubbard MD  
[facility] Appt Note: [facility]  
 [id number] [phone]  
  
   
 [facility] [address] [id number] Upcoming Health Maintenance Date Due  
 HPV Age 9Y-34Y (3 of 1 - Male 3 Dose Series) 4/20/2014 Influenza Age 5 to Adult 8/1/2018 MCV through Age 25 (2 of 2) 4/20/2019 DTaP/Tdap/Td series (7 - Td) 7/8/2024 Allergies as of 8/29/2018  Review Complete On: 8/29/2018 By: Rose Mary Hubbard MD  
 No Known Allergies Current Immunizations  Reviewed on 10/30/2017 Name Date DTaP 4/24/2008, 8/4/2004, 2003, 2003, 2003 Hep A Vaccine 9/13/2017, 9/13/2016 Hep B Vaccine 2003, 2003, 2003, 2003 Hib 4/22/2004, 2003, 2003, 2003 Influenza Vaccine 10/7/2014, 10/16/2013, 10/9/2009, 10/24/2008, 4/7/2006 Influenza Vaccine (Quad) PF 10/30/2017 MMR 4/24/2008, 4/20/2004 Meningococcal (MCV4O) Vaccine 7/8/2014 Pneumococcal Conjugate (PCV-13) 11/2/2004, 2003, 2003, 2003 Poliovirus vaccine 4/24/2008, 2003, 2003, 2003 TB Skin Test (PPD) 4/24/2008 Tdap 7/8/2014 Varicella Virus Vaccine 4/24/2008, 8/4/2004 Not reviewed this visit You Were Diagnosed With   
  
 Codes Comments Attention deficit hyperactivity disorder (ADHD), unspecified ADHD type     ICD-10-CM: F90.9 ICD-9-CM: 314.01 Vitals BP Pulse Temp Height(growth percentile) 101/61 (11 %/ 38 %)* (BP 1 Location: Left arm, BP Patient Position: Sitting) 83 98.3 °F (36.8 °C) (Oral) 5' 6.93\" (1.7 m) (42 %, Z= -0.19) Weight(growth percentile) BMI Smoking Status 121 lb 6.4 oz (55.1 kg) (38 %, Z= -0.30) 19.05 kg/m2 (34 %, Z= -0.41) Never Smoker *BP percentiles are based on NHBPEP's 4th Report Growth percentiles are based on CDC 2-20 Years data. Vitals History BMI and BSA Data Body Mass Index Body Surface Area 19.05 kg/m 2 1.61 m 2 Preferred Pharmacy Pharmacy Name Phone [facility] [address] [place] [facility] [address] [place] [address] [phone] Your Updated Medication List  
  
   
This list is accurate as of 8/29/18  3:12 PM.  Always use your most recent med list.  
  
  
  
  
 ADDERALL XR 15 mg XR capsule Generic drug:  amphetamine-dextroamphetamine XR Take 1 Cap (15 mg total) by mouth every morningIndications: Attention-Deficit Hyperactivity Disorder. Max Daily Amount: 15 mg  
  
 azithromycin 250 mg tablet Commonly known as:  Unice Messing Take 2 tablets today, then take 1 tablet daily  
  
 methylphenidate HCl 10 mg tablet Commonly known as:  RITALIN Take 1 Tab by mouth once over twenty-four (24) hours. Use one tablet at 4:00 pm, may increase dose to 15 mg (1 and 1/2 tablet) if deemed necessary  Indications: Attention-Deficit Hyperactivity Disorder Prescriptions Printed Refills ADDERALL XR 15 mg XR capsule 0 Sig: Take 1 Cap (15 mg total) by mouth every morningIndications: Attention-Deficit Hyperactivity Disorder. Max Daily Amount: 15 mg  
 Class: Print Route: Oral  
 methylphenidate HCl (RITALIN) 10 mg tablet 0 Sig: Take 1 Tab by mouth once over twenty-four (24) hours. Use one tablet at 4:00 pm, may increase dose to 15 mg (1 and 1/2 tablet) if deemed necessary  Indications: Attention-Deficit Hyperactivity Disorder Class: Print  Route: Oral  
 Follow-up Instructions Return in about 1 month (around 9/29/2018) for Follow up ADHD. Patient Instructions Discussed with father that the rating does not show that he needs an increased dose during the school day at this time, but may benefit from an afternoon 4:00 pm homework dose. Request that he eat dinner prior to his dose and then snack later on. He agrees with the plan. Introducing [facility]! Dear Parent or Guardian, Thank you for requesting a [employer] account for your child. With [employer], you can view your childs hospital or ER discharge instructions, current allergies, immunizations and much more. In order to access your childs information, we require a signed consent on file. Please see the [facility] department or call [phone] for instructions on completing a [employer] Proxy request.   
Additional Information If you have questions, please visit the Frequently Asked Questions section of the [employer] website at [email or web address] Remember, [employer] is NOT to be used for urgent needs. For medical emergencies, dial 911. Now available from your iPhone and Android! Please provide this summary of care documentation to your next provider. Your primary care clinician is listed as Clyde Crockett. If you have any questions after today's visit, please call [phone]. 2.02

## 2022-09-29 NOTE — ED ADULT TRIAGE NOTE - ESI TRIAGE ACUITY LEVEL, MLM
TO BE COMPLETED WITHIN 6 HOURS OF INITIAL ASSESSMENT:    For use in patients that have 2 sepsis criteria and new organ dysfunction   •	New or increased oxygen requirement  •	Creatinine >2mg/dL  •	Bilirubin>2mg/dL  •	Platelet <100,000/mm3  •	INR >1.5, PTT>60  •	Lactate >2    If patient persistent hypotension (SBP<90) or any lactate >4 then provider evaluation (Physician/PA/NP) within 30 minutes of bolus completion is required.    Vital Signs Last 24 Hrs  T(C): 37.2 (04 Nov 2020 14:50), Max: 38 (04 Nov 2020 13:22)  T(F): 99 (04 Nov 2020 14:50), Max: 100.4 (04 Nov 2020 13:22)  HR: 85 (04 Nov 2020 14:50) (85 - 107)  BP: 102/74 (04 Nov 2020 14:50) (102/74 - 128/76)  BP(mean): --  RR: 16 (04 Nov 2020 14:50) (16 - 16)  SpO2: 100% (04 Nov 2020 14:50) (96% - 100%)  		  LUNGS:  [ x ] Clear bilaterally [  ] Wheeze [  ] Rhonchi [  ] Rales [  ] Crackles; Other:  HEART: [ x ]RRR [ x ] No murmur [ x ]  Normal S1S2 [ x ] Tachycardia;  Other:  CAPILLARY REFILL:  	Fingers: [ x ] less than 2 seconds [  ] more than 2 seconds                                           Toes: [ x ]  less than 2 seconds [  ] more than 2 seconds   PERIPHERAL PULSES:  Radial: [ x ] Palpable  [  ]  non-palpable                                         Dorsalis Pedis: [ x ] Palpable  [  ] non-palpable                                         Posterior Tibial: [ x ] Palpable  [  ] non-palpable                                          Other:  SKIN:   [  ]  Diaphoretic  [  ]  Mottling  [  ]  Cold extremities  [ x ]  Warm [  ]  Dry                      Other:    BEDSIDE ULTRASOUND FINDINGS (IF APPLICABLE):    Labs:  04 Nov 2020 12:59    138    |  104    |  10     ----------------------------<  106    4.1     |  21     |  0.63     Ca    9.8        04 Nov 2020 12:59    TPro  7.6    /  Alb  4.7    /  TBili  0.2    /  DBili  x      /  AST  18     /  ALT  15     /  AlkPhos  111    04 Nov 2020 12:59                          14.2   7.59  )-----------( 332      ( 04 Nov 2020 12:59 )             44.2     PT/INR - ( 04 Nov 2020 12:59 )   PT: 11.4 sec;   INR: 0.95 ratio         PTT - ( 04 Nov 2020 12:59 )  PTT:38.2 sec  Lactate:    [x ] I have re-evaluated the patient's fluid status and reviewed vital signs. Clinical evaluation demonstrates an appropriate response to fluid resuscitation, with subsequent plan as follows:    Patient received a modified total of fluid resuscitation for the following reason:  [ ] obesity BMI > 30, patient received 30 cc/kg according to Ideal Body Weight   [ ] acute, decompensated CHF   [ ] pulmonary edema    [ ] ESRD with signs of fluid overload  [ ] presence of LVAD     Plan (orders must be placed in EMR):     [  ]  Check Repeat Lactate   [ x ]  No change in current plan  [  ]  Start Vasopressors:  [  ]  Repeat Fluid Bolus:  [  ] other:    Care Discussed with Consultants/Other Providers [ ] YES  [ ] NO 3

## 2022-09-29 NOTE — ED PROVIDER NOTE - NSICDXPASTSURGICALHX_GEN_ALL_CORE_FT
PAST SURGICAL HISTORY:  Cancer of temporal lobe     Fracture of lumbar spine without cord injury lumbar surgery in 2006    H/O arthroscopy of shoulder right - 2001 and 2004    H/O colonoscopy and endoscopy    H/O foot surgery right foot  x 2 - 11 yrs ago

## 2022-09-29 NOTE — ED PROVIDER NOTE - NSFOLLOWUPCLINICS_GEN_ALL_ED_FT
[facility]  Urology  [address], [place] [address]  Phone: [phone]  Fax:   Follow Up Time: Urgent    [facility]  Urology  [address] [place] [address]  Phone: [phone]  Fax:   Follow Up Time: Urgent

## 2022-09-29 NOTE — ED PROVIDER NOTE - NS ED ROS FT
CONSTITUTIONAL: No fevers, + chills, no lightheadedness, no dizziness  EYES: baseline pie in the fantasma,  no eye pain  EARS: no ear drainage, no ear pain, no change in hearing  NOSE: no nasal congestion  MOUTH/THROAT: no sore throat  CV: No chest pain, no palpitations  RESP: + SOB, + chronic cough  GI: + n/v/d, LUQ tenderness and suprapubic tenderness.  : no dysuria, + hematuria, no flank pain  MSK: no back pain, no extremity pain  SKIN: no rashes  NEURO: Post stroke cognitive issues + gait issues, pie in the fantasma eye vision loss @ baseline

## 2022-09-30 VITALS
TEMPERATURE: 98 F | HEART RATE: 55 BPM | OXYGEN SATURATION: 98 % | DIASTOLIC BLOOD PRESSURE: 80 MMHG | SYSTOLIC BLOOD PRESSURE: 151 MMHG | RESPIRATION RATE: 14 BRPM

## 2022-09-30 LAB
APPEARANCE UR: CLEAR — SIGNIFICANT CHANGE UP
BACTERIA # UR AUTO: NEGATIVE — SIGNIFICANT CHANGE UP
BILIRUB UR-MCNC: NEGATIVE — SIGNIFICANT CHANGE UP
COLOR SPEC: SIGNIFICANT CHANGE UP
DIFF PNL FLD: ABNORMAL
EPI CELLS # UR: 3 /HPF — SIGNIFICANT CHANGE UP
GLUCOSE UR QL: NEGATIVE — SIGNIFICANT CHANGE UP
HYALINE CASTS # UR AUTO: 6 /LPF — HIGH (ref 0–2)
KETONES UR-MCNC: NEGATIVE — SIGNIFICANT CHANGE UP
LEUKOCYTE ESTERASE UR-ACNC: NEGATIVE — SIGNIFICANT CHANGE UP
NITRITE UR-MCNC: NEGATIVE — SIGNIFICANT CHANGE UP
PH UR: 8.5 — HIGH (ref 5–8)
PROT UR-MCNC: ABNORMAL
RBC CASTS # UR COMP ASSIST: 5 /HPF — HIGH (ref 0–4)
SARS-COV-2 RNA SPEC QL NAA+PROBE: SIGNIFICANT CHANGE UP
SP GR SPEC: >1.05 (ref 1.01–1.02)
UROBILINOGEN FLD QL: NEGATIVE — SIGNIFICANT CHANGE UP
WBC UR QL: 2 /HPF — SIGNIFICANT CHANGE UP (ref 0–5)

## 2022-09-30 PROCEDURE — 85025 COMPLETE CBC W/AUTO DIFF WBC: CPT

## 2022-09-30 PROCEDURE — 81001 URINALYSIS AUTO W/SCOPE: CPT

## 2022-09-30 PROCEDURE — 82803 BLOOD GASES ANY COMBINATION: CPT

## 2022-09-30 PROCEDURE — 99285 EMERGENCY DEPT VISIT HI MDM: CPT | Mod: 25

## 2022-09-30 PROCEDURE — 80053 COMPREHEN METABOLIC PANEL: CPT

## 2022-09-30 PROCEDURE — 96375 TX/PRO/DX INJ NEW DRUG ADDON: CPT

## 2022-09-30 PROCEDURE — 85014 HEMATOCRIT: CPT

## 2022-09-30 PROCEDURE — U0005: CPT

## 2022-09-30 PROCEDURE — 82435 ASSAY OF BLOOD CHLORIDE: CPT

## 2022-09-30 PROCEDURE — 36415 COLL VENOUS BLD VENIPUNCTURE: CPT

## 2022-09-30 PROCEDURE — 83690 ASSAY OF LIPASE: CPT

## 2022-09-30 PROCEDURE — 96374 THER/PROPH/DIAG INJ IV PUSH: CPT | Mod: XU

## 2022-09-30 PROCEDURE — 93005 ELECTROCARDIOGRAM TRACING: CPT

## 2022-09-30 PROCEDURE — 82947 ASSAY GLUCOSE BLOOD QUANT: CPT

## 2022-09-30 PROCEDURE — 84295 ASSAY OF SERUM SODIUM: CPT

## 2022-09-30 PROCEDURE — 83605 ASSAY OF LACTIC ACID: CPT

## 2022-09-30 PROCEDURE — 84132 ASSAY OF SERUM POTASSIUM: CPT

## 2022-09-30 PROCEDURE — 82330 ASSAY OF CALCIUM: CPT

## 2022-09-30 PROCEDURE — 85018 HEMOGLOBIN: CPT

## 2022-09-30 PROCEDURE — 87086 URINE CULTURE/COLONY COUNT: CPT

## 2022-09-30 PROCEDURE — U0003: CPT

## 2022-09-30 PROCEDURE — 74177 CT ABD & PELVIS W/CONTRAST: CPT | Mod: MA

## 2022-09-30 RX ORDER — SODIUM CHLORIDE 9 MG/ML
1000 INJECTION, SOLUTION INTRAVENOUS ONCE
Refills: 0 | Status: COMPLETED | OUTPATIENT
Start: 2022-09-30 | End: 2022-09-30

## 2022-09-30 RX ORDER — KETOROLAC TROMETHAMINE 30 MG/ML
15 SYRINGE (ML) INJECTION ONCE
Refills: 0 | Status: DISCONTINUED | OUTPATIENT
Start: 2022-09-30 | End: 2022-09-30

## 2022-09-30 RX ADMIN — SODIUM CHLORIDE 1000 MILLILITER(S): 9 INJECTION, SOLUTION INTRAVENOUS at 00:37

## 2022-09-30 RX ADMIN — Medication 15 MILLIGRAM(S): at 00:37

## 2022-09-30 NOTE — ED ADULT NURSE REASSESSMENT NOTE - NS ED NURSE REASSESS COMMENT FT1
Pt discharged home by ACP, education for home care and instructions for follow up provided by provider at that time.

## 2022-10-01 LAB
CULTURE RESULTS: SIGNIFICANT CHANGE UP
SPECIMEN SOURCE: SIGNIFICANT CHANGE UP

## 2022-10-11 ENCOUNTER — APPOINTMENT (OUTPATIENT)
Dept: UROLOGY | Facility: CLINIC | Age: 47
End: 2022-10-11

## 2022-10-11 VITALS
BODY MASS INDEX: 32.44 KG/M2 | HEIGHT: 64 IN | DIASTOLIC BLOOD PRESSURE: 58 MMHG | WEIGHT: 190 LBS | HEART RATE: 70 BPM | TEMPERATURE: 98 F | SYSTOLIC BLOOD PRESSURE: 89 MMHG

## 2022-10-11 PROCEDURE — 99203 OFFICE O/P NEW LOW 30 MIN: CPT

## 2022-11-01 ENCOUNTER — NON-APPOINTMENT (OUTPATIENT)
Age: 47
End: 2022-11-01

## 2022-11-01 DIAGNOSIS — N20.1 CALCULUS OF URETER: ICD-10-CM

## 2022-11-01 RX ORDER — ONDANSETRON 4 MG/1
4 TABLET, ORALLY DISINTEGRATING ORAL
Qty: 15 | Refills: 0 | Status: COMPLETED | COMMUNITY
Start: 2022-11-01 | End: 2022-11-06

## 2022-11-02 ENCOUNTER — OUTPATIENT (OUTPATIENT)
Dept: OUTPATIENT SERVICES | Facility: HOSPITAL | Age: 47
LOS: 1 days | End: 2022-11-02
Payer: COMMERCIAL

## 2022-11-02 ENCOUNTER — APPOINTMENT (OUTPATIENT)
Dept: CT IMAGING | Facility: IMAGING CENTER | Age: 47
End: 2022-11-02

## 2022-11-02 DIAGNOSIS — C71.2 MALIGNANT NEOPLASM OF TEMPORAL LOBE: Chronic | ICD-10-CM

## 2022-11-02 DIAGNOSIS — N20.1 CALCULUS OF URETER: ICD-10-CM

## 2022-11-02 PROCEDURE — 74176 CT ABD & PELVIS W/O CONTRAST: CPT

## 2022-11-02 PROCEDURE — 74176 CT ABD & PELVIS W/O CONTRAST: CPT | Mod: 26,MH

## 2022-11-11 ENCOUNTER — TRANSCRIPTION ENCOUNTER (OUTPATIENT)
Age: 47
End: 2022-11-11

## 2022-11-11 ENCOUNTER — APPOINTMENT (OUTPATIENT)
Dept: MRI IMAGING | Facility: CLINIC | Age: 47
End: 2022-11-11

## 2022-11-11 ENCOUNTER — OUTPATIENT (OUTPATIENT)
Dept: OUTPATIENT SERVICES | Facility: HOSPITAL | Age: 47
LOS: 1 days | End: 2022-11-11
Payer: COMMERCIAL

## 2022-11-11 DIAGNOSIS — C71.9 MALIGNANT NEOPLASM OF BRAIN, UNSPECIFIED: ICD-10-CM

## 2022-11-11 DIAGNOSIS — C71.2 MALIGNANT NEOPLASM OF TEMPORAL LOBE: Chronic | ICD-10-CM

## 2022-11-11 PROCEDURE — 70553 MRI BRAIN STEM W/O & W/DYE: CPT | Mod: 26,MH

## 2022-11-11 PROCEDURE — 70553 MRI BRAIN STEM W/O & W/DYE: CPT

## 2022-11-11 PROCEDURE — A9585: CPT

## 2022-11-14 ENCOUNTER — APPOINTMENT (OUTPATIENT)
Dept: NEUROLOGY | Facility: CLINIC | Age: 47
End: 2022-11-14

## 2022-11-14 ENCOUNTER — NON-APPOINTMENT (OUTPATIENT)
Age: 47
End: 2022-11-14

## 2022-11-14 VITALS
SYSTOLIC BLOOD PRESSURE: 100 MMHG | TEMPERATURE: 98.4 F | OXYGEN SATURATION: 98 % | HEIGHT: 64 IN | HEART RATE: 76 BPM | WEIGHT: 187 LBS | RESPIRATION RATE: 16 BRPM | DIASTOLIC BLOOD PRESSURE: 60 MMHG | BODY MASS INDEX: 31.92 KG/M2

## 2022-11-14 PROCEDURE — 99215 OFFICE O/P EST HI 40 MIN: CPT

## 2022-11-14 RX ORDER — ARIPIPRAZOLE 10 MG/1
10 TABLET ORAL
Qty: 30 | Refills: 0 | Status: ACTIVE | COMMUNITY
Start: 2022-11-03

## 2022-11-14 RX ORDER — FLUOXETINE HYDROCHLORIDE 10 MG/1
10 TABLET ORAL
Qty: 30 | Refills: 0 | Status: COMPLETED | COMMUNITY
Start: 2022-11-03

## 2022-11-14 RX ORDER — AZITHROMYCIN 250 MG/1
250 TABLET, FILM COATED ORAL
Qty: 6 | Refills: 0 | Status: DISCONTINUED | COMMUNITY
Start: 2022-11-07

## 2022-11-14 RX ORDER — ALPRAZOLAM 0.25 MG/1
0.25 TABLET ORAL
Qty: 90 | Refills: 0 | Status: ACTIVE | COMMUNITY
Start: 2022-11-03

## 2022-11-14 RX ORDER — ATORVASTATIN CALCIUM 10 MG/1
10 TABLET, FILM COATED ORAL
Qty: 30 | Refills: 0 | Status: ACTIVE | COMMUNITY
Start: 2022-08-17

## 2022-11-14 RX ORDER — BECLOMETHASONE DIPROPIONATE HFA 80 UG/1
80 AEROSOL, METERED RESPIRATORY (INHALATION)
Qty: 10 | Refills: 0 | Status: ACTIVE | COMMUNITY
Start: 2022-08-16

## 2022-11-14 RX ORDER — TOPIRAMATE 25 MG/1
25 TABLET, FILM COATED ORAL
Qty: 30 | Refills: 0 | Status: ACTIVE | COMMUNITY
Start: 2022-11-03

## 2022-11-14 NOTE — PHYSICAL EXAM
[FreeTextEntry1] : \par Exam as below (with documented exceptions in parentheses):\par \par General:  Healthy appearing woman well groomed and without deformities. KPS is 80.  tenderness overlying one of the screws under right scalp\par \par Mental Status:  Awake, alert and attentive. Oriented to person, place and time. Recent and remote memory intact. Normal concentration. Fluent spontaneous speech with intact naming and repetition. Normal fund of knowledge.  \par \par Cranial Nerves: II: Full visual fields. III,IV,VI:Pupils round, reactive to light. Full extraocular movements. No nystagmus. V: Normal bilateral bite, facial sensation. VII: No facial weakness. VIII: Hearing intact. IX,X: Palate midline, intact gag. XI:  Sternocleidomastoids normal. XII: Tongue protrudes midline. No dysarthria. \par \par Motor:  Normal tone, bulk and power throughout including arms and legs, proximal and distal. No pronator drift. No abnormal movements.  (LUE DRIFT, slow FFM)\par \par Sensation: Normal in arms, legs and trunk to pin, proprioception and vibration. Negative Romberg. \par \par Coordination: No dysmetria or dysdiadochokinesis bilaterally. Normal heel-shin testing. \par \par Gait: Normal including heel and toe walking. Normal station. \par \par Reflexes: Normoactive and symmetric throughout. Absent Babinski bilaterally. \par \par Vascular: No peripheral edema/calf tenderness. \par \par Eyes: Funduscopic exam without papilledema (DEFERRED)\par \par Heart: Regular rate and rhythm. Normal s1-s2. No murmurs, rubs or gallops. \par \par Respiratory: Lungs clear to auscultation bilaterally. \par \par Abdomen: Nontender, non-distended. No hepatosplenomegaly. Normal bowel sounds in four quadrants. \par \par

## 2022-11-14 NOTE — HISTORY OF PRESENT ILLNESS
[FreeTextEntry1] : This 47 year old right handed woman is seen in followup for evaluation and management of glioma\par \par She presented in 12/14 with a seizure.  She had a right temporal tumor biopsied then resected in staged procedure in 1/15 by Dr. Baez at [facility]. Course was complicated by perioperative stroke in the right CR.  Pathology revealed IDH mutant glioma, astrocytic, 1p19q intact, MGMT methylated.  She had RT and temozolomide, followed by 6 cycles of TMZ, last in 10/15.   She followed with Dr. Duffy, then with Dr. Huang.  Clinical issues have been chronic headaches, and pre-existing psychiatric  condition (bipolar spectrum).  Her father, a retired physician, oversees her care.  \par \par INTERVAL HISTORY:  she had intermittent scalp sensitivity. MRI brain without significant new finding.   \par \par PMH:   Bipolar.  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  .  Lives in same building as parents.  Nonsmoker. nondrinker\par \par FHX:  no brain tumors

## 2022-11-14 NOTE — DISCUSSION/SUMMARY
[FreeTextEntry1] : IDH mutant 1p19q intact anaplastic glioma, MGMT methylated.  S/p RT/TMZ.  Scan stable, We discussed scan and semiannual follow up.  \par \par \par TUMOR:  \par new scan for 6 months.  advised to call in event of new symptoms\par \par EPILEPSY:\par remote seizure.  on TPX stable dose 100/125\par \par RTC 6mo

## 2022-11-14 NOTE — DATA REVIEWED
[de-identified] : I personally reviewed available MRIs including 11/22 MRI compared with 5/22, and prior going back to 2018 and prior from 2015 and 2014\par there is no new enhancement or progressive FLAIR. The vents are stable.  The scan appears essentially the same going back to 2018.

## 2023-02-02 NOTE — BEHAVIORAL HEALTH ASSESSMENT NOTE - NSBHTIMEBASEDBILLING_PSY_A_CORE
[address] [place] [address]  [phone]           Patient: Joaquin Andrews                MRN: [id number]       SSN: [id number]  YOB: 1966        AGE: 61 y.o. SEX: male  There is no height or weight on file to calculate BMI. PCP: Adrien Botello MD  02/02/23      This office note has been dictated. REVIEW OF SYSTEMS:  Constitutional: Negative for fever, chills, weight loss and malaise/fatigue. HENT: Negative. Eyes: Negative. Respiratory: Negative. Cardiovascular: Negative. Gastrointestinal: No bowel incontinence or constipation. Genitourinary: No bladder incontinence or saddle anesthesia. Skin: Negative. Neurological: Negative. Endo/Heme/Allergies: Negative. Psychiatric/Behavioral: Negative. Musculoskeletal: As per HPI above. Past Medical History:   Diagnosis Date    Chronic back pain     Diabetes ([facility] [address]     Hypertension     Unspecified sleep apnea     Use CPAP         Current Outpatient Medications:     oxyCODONE-acetaminophen (Percocet) 7.5-325 mg per tablet, Take 1-2 Tablets by mouth every eight (8) hours as needed for Pain for up to 7 days. Max Daily Amount: 6 Tablets. , Disp: 42 Tablet, Rfl: 0    oxyCODONE-acetaminophen (Percocet) 7.5-325 mg per tablet, Take 1-2 Tablets by mouth every eight (8) hours as needed for Pain for up to 7 days. Max Daily Amount: 6 Tablets. , Disp: 42 Tablet, Rfl: 0    docusate sodium (Colace) 100 mg capsule, Take 1 Capsule by mouth two (2) times a day for 90 days. , Disp: 60 Capsule, Rfl: 2    aspirin delayed-release 81 mg tablet, Take 1 Tablet by mouth two (2) times a day., Disp: 60 Tablet, Rfl: 1    cefadroxil (DURICEF) 500 mg capsule, Take 1 Capsule by mouth two (2) times a day., Disp: 14 Capsule, Rfl: 0    celecoxib (CeleBREX) 200 mg capsule, Take 1 Capsule by mouth two (2) times a day for 90 days. , Disp: 60 Capsule, Rfl: 2    ondansetron hcl (Zofran) 4 mg tablet, Take 1 Tablet by mouth every eight (8) hours as needed for Nausea or Vomiting., Disp: 30 Tablet, Rfl: 1    levothyroxine (SYNTHROID) 175 mcg tablet, Take 175 mcg by mouth Daily (before breakfast). , Disp: , Rfl:     amLODIPine (NORVASC) 10 mg tablet, Take  by mouth daily. , Disp: , Rfl:     atorvastatin (LIPITOR) 10 mg tablet, Take  by mouth daily. , Disp: , Rfl:     hydroCHLOROthiazide (HYDRODIURIL) 25 mg tablet, Take 25 mg by mouth daily. , Disp: , Rfl:     insulin glargine (LANTUS,BASAGLAR) 100 unit/mL (3 mL) inpn, 50 Units by SubCUTAneous route daily. , Disp: , Rfl:     losartan (COZAAR) 100 mg tablet, Take 100 mg by mouth daily. , Disp: , Rfl:     cholecalciferol (VITAMIN D3) 25 mcg (1,000 unit) cap, Take  by mouth daily. , Disp: , Rfl:     potassium chloride SA (MICRO-K) 10 mEq capsule, Take 10 mEq by mouth daily. , Disp: , Rfl:     metFORMIN (GLUCOPHAGE) 1,000 mg tablet, Take 1,000 mg by mouth two (2) times daily (with meals). , Disp: , Rfl:     No Known Allergies    Social History     Socioeconomic History    Marital status:      Spouse name: Not on file    Number of children: Not on file    Years of education: Not on file    Highest education level: Not on file   Occupational History    Not on file   Tobacco Use    Smoking status: Never    Smokeless tobacco: Never   Vaping Use    Vaping Use: Never used   Substance and Sexual Activity    Alcohol use: Yes    Drug use: Never    Sexual activity: Not on file   Other Topics Concern    Not on file   Social History Narrative    Not on file     Social Determinants of Health     Financial Resource Strain: Not on file   Food Insecurity: Not on file   Transportation Needs: Not on file   Physical Activity: Inactive    Days of Exercise per Week: 0 days    Minutes of Exercise per Session: 0 min   Stress: Not on file   Social Connections: Not on file   Intimate Partner Violence: Not At Risk    Fear of Current or Ex-Partner: No    Emotionally Abused:  No Physically Abused: No    Sexually Abused: No   Housing Stability: Not on file       Past Surgical History:   Procedure Laterality Date    HC KNEE ARTHROSCOPY/SURGERY Left     AR UNLISTED NEUROLOGICAL/NEUROMUSCULAR DX PX      spinal fusion X10 Lumbar region       Patient seen evaluated today for his left total knee replacement. He has now 17 days status post surgery doing well. He had no troubles the wound. Pain is well controlled. He has been receiving home health physical therapy without complications. Patient denies recent fevers, chills, chest pain, SOB, or injuries. No recent systemic changes noted. A 12-point review of systems is performed today. Pertinent positives are noted. All other systems reviewed and otherwise are negative. Physical exam: General: Alert and oriented x3, nad.  well-developed, well nourished. normal affect, AF. NC/AT, EOMI, neck supple, trachea midline, no JVD present. Breathing is non-labored. Examination of the left knee reveals skin intact. The surgical wound is healed nicely. Staples in place. There is no erythema or ecchymosis noted. There is minimal swelling. There are no signs for infection or cellulitis present. He is able to hold a straight leg raise without complications. There are no defects in the extensor mechanism. He has negative Homans. No signs of DVT present. Range of motion is -4-95 degrees. Assessment: Status post left total knee replacement    Plan: At this point, the staples were removed and replaced with Steri-Strips no complications. He will be set up with outpatient physical therapy. He will continue with ice therapy. He is instructed on range of motion activities on an hourly basis both flexion and extension. He is given a note to allow him to telework which allows for the next 4 to 5 months. We will see him back in 2 weeks time for evaluation and range of motion check.                 JR Iker LIZARRAGA, PAErikaC, ATC no

## 2023-05-18 ENCOUNTER — NON-APPOINTMENT (OUTPATIENT)
Age: 48
End: 2023-05-18

## 2023-05-22 ENCOUNTER — OUTPATIENT (OUTPATIENT)
Dept: OUTPATIENT SERVICES | Facility: HOSPITAL | Age: 48
LOS: 1 days | End: 2023-05-22
Payer: COMMERCIAL

## 2023-05-22 ENCOUNTER — APPOINTMENT (OUTPATIENT)
Dept: NEUROLOGY | Facility: CLINIC | Age: 48
End: 2023-05-22
Payer: COMMERCIAL

## 2023-05-22 ENCOUNTER — APPOINTMENT (OUTPATIENT)
Dept: MRI IMAGING | Facility: IMAGING CENTER | Age: 48
End: 2023-05-22
Payer: COMMERCIAL

## 2023-05-22 VITALS
HEART RATE: 65 BPM | SYSTOLIC BLOOD PRESSURE: 104 MMHG | DIASTOLIC BLOOD PRESSURE: 63 MMHG | HEIGHT: 64 IN | OXYGEN SATURATION: 97 % | RESPIRATION RATE: 16 BRPM | TEMPERATURE: 98.6 F

## 2023-05-22 DIAGNOSIS — C71.2 MALIGNANT NEOPLASM OF TEMPORAL LOBE: Chronic | ICD-10-CM

## 2023-05-22 DIAGNOSIS — C71.9 MALIGNANT NEOPLASM OF BRAIN, UNSPECIFIED: ICD-10-CM

## 2023-05-22 DIAGNOSIS — Z00.8 ENCOUNTER FOR OTHER GENERAL EXAMINATION: ICD-10-CM

## 2023-05-22 PROCEDURE — 70553 MRI BRAIN STEM W/O & W/DYE: CPT

## 2023-05-22 PROCEDURE — 70553 MRI BRAIN STEM W/O & W/DYE: CPT | Mod: 26

## 2023-05-22 PROCEDURE — 99214 OFFICE O/P EST MOD 30 MIN: CPT

## 2023-05-22 PROCEDURE — A9585: CPT

## 2023-05-23 NOTE — DATA REVIEWED
[de-identified] : I personally reviewed available MRIs including 5/23 compared with 11/22 MRI compared with 5/22, and prior going back to 2018 and prior from 2015 and 2014 there is no new enhancement or progressive FLAIR. The vents are stable.  The scan appears essentially the same going back to 2018.

## 2023-05-23 NOTE — HISTORY OF PRESENT ILLNESS
[FreeTextEntry1] : This 47 year old right handed woman is seen in followup for evaluation and management of glioma\par \par She presented in 12/14 with a seizure.  She had a right temporal tumor biopsied then resected in staged procedure in 1/15 by Dr. Baez at [facility]. Course was complicated by perioperative stroke in the right CR.  Pathology revealed IDH mutant glioma, astrocytic, 1p19q intact, MGMT methylated.  She had RT and temozolomide, followed by 6 cycles of TMZ, last in 10/15.   She followed with Dr. Duffy, then with Dr. Huang.  Clinical issues have been chronic headaches, and pre-existing psychiatric  condition (bipolar spectrum).  Her father, a retired physician, oversees her care.  \par \par INTERVAL HISTORY:  continues to experience intermittent scalp sensitivity that is worse during the springtime during allergy season. No seizures. Takes Topamax 100-125 daily as prescribed by neuropsychiatrist Dr. Palencia. MRI brain without significant new finding.  \par \par PMH:   Bipolar.  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  .  Lives in same building as parents.  Nonsmoker. nondrinker\par \par FHX:  no brain tumors

## 2023-05-23 NOTE — DISCUSSION/SUMMARY
[FreeTextEntry1] : IDH mutant 1p19q intact anaplastic glioma, MGMT methylated.  S/p RT/TMZ.  Scan stable, We discussed scan and semiannual follow up.  \par \par \par TUMOR:  \par new scan for 6 months.  advised to call in event of new symptoms\par \par EPILEPSY:\par  TPX stable dose 100/125\par \par RTC 6mo

## 2023-06-01 NOTE — BEHAVIORAL HEALTH ASSESSMENT NOTE - NSBHATTESTSEENBY_PSY_A_CORE
We will treat with topical nystatin-triamcinolone cream. Discussed with patient that rash has both properties of fungal infection and contact dermatitis. If no improvement with nystatin-triamcinolone, patient is to notify the office.   
Attending Psychiatrist supervising NP/Trainee, meeting pt...

## 2023-09-04 ENCOUNTER — NON-APPOINTMENT (OUTPATIENT)
Age: 48
End: 2023-09-04

## 2023-09-08 ENCOUNTER — NON-APPOINTMENT (OUTPATIENT)
Age: 48
End: 2023-09-08

## 2023-09-11 ENCOUNTER — APPOINTMENT (OUTPATIENT)
Dept: NEUROLOGY | Facility: CLINIC | Age: 48
End: 2023-09-11
Payer: COMMERCIAL

## 2023-09-11 ENCOUNTER — OUTPATIENT (OUTPATIENT)
Dept: OUTPATIENT SERVICES | Facility: HOSPITAL | Age: 48
LOS: 1 days | End: 2023-09-11
Payer: COMMERCIAL

## 2023-09-11 ENCOUNTER — APPOINTMENT (OUTPATIENT)
Dept: MRI IMAGING | Facility: IMAGING CENTER | Age: 48
End: 2023-09-11
Payer: COMMERCIAL

## 2023-09-11 VITALS
TEMPERATURE: 98.7 F | OXYGEN SATURATION: 95 % | WEIGHT: 196 LBS | BODY MASS INDEX: 33.46 KG/M2 | DIASTOLIC BLOOD PRESSURE: 65 MMHG | HEART RATE: 71 BPM | HEIGHT: 64 IN | SYSTOLIC BLOOD PRESSURE: 100 MMHG

## 2023-09-11 DIAGNOSIS — C71.9 MALIGNANT NEOPLASM OF BRAIN, UNSPECIFIED: ICD-10-CM

## 2023-09-11 DIAGNOSIS — C71.2 MALIGNANT NEOPLASM OF TEMPORAL LOBE: Chronic | ICD-10-CM

## 2023-09-11 PROCEDURE — 99214 OFFICE O/P EST MOD 30 MIN: CPT

## 2023-09-11 PROCEDURE — A9585: CPT

## 2023-09-11 PROCEDURE — 70553 MRI BRAIN STEM W/O & W/DYE: CPT

## 2023-09-11 PROCEDURE — 70553 MRI BRAIN STEM W/O & W/DYE: CPT | Mod: 26,MH

## 2023-09-24 NOTE — ED ADULT TRIAGE NOTE - WEIGHT METHOD
stated
Laboratory results reviewed and discussed with patient. CBC shows normal WBC count, Hb/Hct and plateelet count are WNL. BMP shows electrolytes WNL and no MARY. Liver Function tests shows elevated LFTs.    Troponin is negative.  Patient remained hemodynamically stable during the course of ED stay. Discussed with patient about the results of the diagnostic studies. Discussed with admitting physician and MAR, patient is admitted to Medicine for further evaluation and care.

## 2023-10-12 ENCOUNTER — APPOINTMENT (OUTPATIENT)
Dept: UROLOGY | Facility: CLINIC | Age: 48
End: 2023-10-12

## 2023-10-16 NOTE — ED PROVIDER NOTE - PATIENT PORTAL LINK FT
Patient's son called and was given our office phone number to set up home care visits for his mother to establish PCP. He states she is paranoid and has episodes of psychosis and will not leave her house. Is this something that the office/provider can do? He can be reached at [phone] and said after 1:30 is best.  There is no voicemail set up so if he does not answer please try again at a later time. You can access the FollowMyHealth Patient Portal offered by [facility] by registering at the following website: [email or web address]. By joining [employer]’s FollowMyHealth portal, you will also be able to view your health information using other applications (apps) compatible with our system.

## 2023-11-01 ENCOUNTER — APPOINTMENT (OUTPATIENT)
Dept: NEUROLOGY | Facility: CLINIC | Age: 48
End: 2023-11-01
Payer: COMMERCIAL

## 2023-11-01 ENCOUNTER — OUTPATIENT (OUTPATIENT)
Dept: OUTPATIENT SERVICES | Facility: HOSPITAL | Age: 48
LOS: 1 days | End: 2023-11-01
Payer: COMMERCIAL

## 2023-11-01 ENCOUNTER — APPOINTMENT (OUTPATIENT)
Dept: MRI IMAGING | Facility: IMAGING CENTER | Age: 48
End: 2023-11-01
Payer: COMMERCIAL

## 2023-11-01 VITALS
WEIGHT: 194 LBS | HEART RATE: 62 BPM | OXYGEN SATURATION: 99 % | RESPIRATION RATE: 16 BRPM | BODY MASS INDEX: 33.12 KG/M2 | HEIGHT: 64 IN | SYSTOLIC BLOOD PRESSURE: 107 MMHG | DIASTOLIC BLOOD PRESSURE: 70 MMHG

## 2023-11-01 DIAGNOSIS — C71.2 MALIGNANT NEOPLASM OF TEMPORAL LOBE: Chronic | ICD-10-CM

## 2023-11-01 DIAGNOSIS — C71.9 MALIGNANT NEOPLASM OF BRAIN, UNSPECIFIED: ICD-10-CM

## 2023-11-01 DIAGNOSIS — G44.89 OTHER HEADACHE SYNDROME: ICD-10-CM

## 2023-11-01 PROCEDURE — 99214 OFFICE O/P EST MOD 30 MIN: CPT

## 2023-11-01 PROCEDURE — 70553 MRI BRAIN STEM W/O & W/DYE: CPT | Mod: 26,MH

## 2023-11-01 PROCEDURE — 70553 MRI BRAIN STEM W/O & W/DYE: CPT

## 2023-11-01 PROCEDURE — A9585: CPT

## 2023-11-10 NOTE — PROGRESS NOTE BEHAVIORAL HEALTH - NSBHATTESTNPTRAINEE_PSY_A_CORE
Patient : Aurora George Age: 38 year old Sex: female   MRN: [id number] Encounter Date: 11/9/2023    History     Chief Complaint   Patient presents with   • Dental Problem       38-year-old female who presented to the emergency department from her dentist's office with concern for a dental abscess, facial swelling.     Patient's dentist called prior to ED arrival, requested IV antibiotic therapy.   Patient has had waxing waning dental infection for extended period of time, per her dentist, he recommended the affected teeth to be excised 1 year ago.    Three weeks ago, patient completed outpatient antibiotics and dexamethasone.     She has had recurrence of facial swelling, pain at site of left lower molars (worse with eating, worse with direct pressure)    No difficulty swallowing, no difficulty breathing.     No fevers.               Allergies   Allergen Reactions   • Levaquin HIVES and SWELLING     Throat swells   • Morphine HIVES and SWELLING     Throat swells       Current Facility-Administered Medications   Medication   • ketorolac (TORADOL) injection 15 mg     Current Outpatient Medications   Medication Sig   • nitrofurantoin, macrocrystal-monohydrate, (Macrobid) 100 MG capsule Take 1 capsule by mouth in the morning and 1 capsule in the evening. Do all this for 5 days.   • cetirizine (ZyrTEC Allergy) 10 MG tablet Take 1 tablet by mouth daily as needed for Allergies (allergies).   • valACYclovir (VALTREX) 500 MG tablet Take 500 mg by mouth.   • olopatadine (Patanase) 0.6 % nasal spray 1-2 spray in each nostril 1-2 times a day as needed for persistent runny nose, stuffy nose or post nasal drip   • Ofatumumab (Kesimpta) 20 MG/0.4ML Solution Auto-injector    • fluticasone (FLONASE) 50 MCG/ACT nasal spray 1 spray in each nostril 1-2 times a day as needed for nasal congestion, runny nose, post nasal drip, sinus pressure, or fullness sensation in ears       Past Medical History:   Diagnosis Date   • Fracture    •  Gastroesophageal reflux disease    • Multiple sclerosis (CMD)    • Vitamin D deficiency        Past Surgical History:   Procedure Laterality Date   • Gynecologic cryosurgery      Tubal Ligation   • Knee arthroscopy w/ meniscectomy      Left Knee   • Neurofibroma excision Left 05/05/2023    left forearm   • Tonsillectomy and adenoidectomy     • Tubal ligation         Family History   Problem Relation Age of Onset   • Migraine Mother    • Thyroid Mother        Social History     Tobacco Use   • Smoking status: Some Days     Types: Cigarettes   • Smokeless tobacco: Never   Vaping Use   • Vaping Use: never used   Substance Use Topics   • Alcohol use: Yes     Alcohol/week: 0.8 standard drinks of alcohol     Types: 1 drink(s) per week     Comment: ocassional   • Drug use: No       Review of Systems     Review of Systems   Constitutional: Negative for fever.   HENT: Positive for dental problem. Negative for sore throat.    Respiratory: Negative for shortness of breath.    Gastrointestinal: Negative for nausea and vomiting.   Musculoskeletal: Negative for neck pain and neck stiffness.   Hematological: Negative.    Psychiatric/Behavioral: Negative for agitation.       Physical Exam     ED Triage Vitals [11/09/23 1907]   ED Triage Vitals Group      Temp 98.6 °F (37 °C)      Heart Rate 79      Resp 18      /60      SpO2 99 %      EtCO2 mmHg       Height       Weight 171 lb 6.5 oz (77.7 kg)      Weight Scale Used Standing scale      BMI (Calculated)       IBW/kg (Calculated)        Physical Exam  Constitutional:       General: She is not in acute distress.     Appearance: She is not toxic-appearing.   HENT:      Head: Atraumatic.      Nose: Nose normal.      Mouth/Throat:      Mouth: Mucous membranes are moist.      Dentition: Dental tenderness, gingival swelling, dental caries and dental abscesses present.      Tongue: No lesions. Tongue does not deviate from midline.      Pharynx: Oropharynx is clear. No pharyngeal  swelling.      Tonsils: No tonsillar exudate.      Comments: Significant dental emi, with periapical abscess, jaw swelling present  Pulmonary:      Effort: Pulmonary effort is normal. No respiratory distress.      Breath sounds: No stridor.   Skin:     General: Skin is warm and dry.   Neurological:      General: No focal deficit present.      Mental Status: She is alert.   Psychiatric:         Mood and Affect: Mood normal.           Procedures     Incision and Drainage    Date/Time: 11/9/2023 9:32 PM    Performed by: Elliott Gonzalez MD  Authorized by: Elliott Gonzalez MD    Consent:     Consent obtained:  Verbal    Consent given by:  Patient    Risks discussed:  Bleeding, incomplete drainage, pain and infection    Alternatives discussed:  No treatment  Location:     Type:  Abscess    Location:  Mouth    Mouth location:  Alveolar process  Pre-procedure details:     Procedure prep: Swish and spit of peroxide prior to procedure.  Sedation:     Sedation type:  None  Anesthesia:     Anesthesia method:  Local infiltration    Local anesthetic:  Lidocaine 1% w/o epi  Procedure type:     Complexity:  Simple  Procedure details:     Needle aspiration: yes      Needle size:  18 G    Incision type: Needle aspirated x3.    Incision depth:  Submucosal    Drainage:  Bloody and purulent    Drainage amount:  Scant    Packing materials:  None  Post-procedure details:     Procedure completion:  Tolerated well, no immediate complications        Lab Results     Results for orders placed or performed during the hospital encounter of 11/09/23   Comprehensive Metabolic Panel   Result Value Ref Range    Fasting Status      Sodium 138 135 - 145 mmol/L    Potassium 4.0 3.4 - 5.1 mmol/L    Chloride 104 97 - 110 mmol/L    Carbon Dioxide 26 21 - 32 mmol/L    Anion Gap 12 7 - 19 mmol/L    Glucose 94 70 - 99 mg/dL    BUN 13 6 - 20 mg/dL    Creatinine 0.58 0.51 - 0.95 mg/dL    Glomerular Filtration Rate >90 >=60    BUN/Cr 22 7 - 25    Calcium 8.4  8.4 - 10.2 mg/dL    Bilirubin, Total 0.4 0.2 - 1.0 mg/dL    GOT/AST 30 <=37 Units/L    GPT/ALT 36 <64 Units/L    Alkaline Phosphatase 75 45 - 117 Units/L    Albumin 3.5 (L) 3.6 - 5.1 g/dL    Protein, Total 7.0 6.4 - 8.2 g/dL    Globulin 3.5 2.0 - 4.0 g/dL    A/G Ratio 1.0 1.0 - 2.4   CBC with Automated Differential (performable only)   Result Value Ref Range    WBC 6.0 4.2 - 11.0 K/mcL    RBC 4.23 4.00 - 5.20 mil/mcL    HGB 13.5 12.0 - 15.5 g/dL    HCT 38.3 36.0 - 46.5 %    MCV 90.5 78.0 - 100.0 fl    MCH 31.9 26.0 - 34.0 pg    MCHC 35.2 32.0 - 36.5 g/dL    RDW-CV 11.8 11.0 - 15.0 %    RDW-SD 38.9 (L) 39.0 - 50.0 fL     140 - 450 K/mcL    NRBC 0 <=0 /100 WBC    Neutrophil, Percent 61 %    Lymphocytes, Percent 25 %    Mono, Percent 11 %    Eosinophils, Percent 2 %    Basophils, Percent 1 %    Immature Granulocytes 0 %    Absolute Neutrophils 3.8 1.8 - 7.7 K/mcL    Absolute Lymphocytes 1.5 1.0 - 4.8 K/mcL    Absolute Monocytes 0.6 0.3 - 0.9 K/mcL    Absolute Eosinophils  0.1 0.0 - 0.5 K/mcL    Absolute Basophils 0.0 0.0 - 0.3 K/mcL    Absolute Immature Granulocytes 0.0 0.0 - 0.2 K/mcL           Radiology Results     Imaging Results    None         ED Medications     ED Medication Orders (From admission, onward)    Ordered Start     Status Ordering Provider    11/09/23 1950 11/09/23 2030  clindamycin (CLEOCIN) in dextrose 5% premix IVPB 600 mg  ONCE         Last MAR action: Completed HASELTINE, DARLENE    11/09/23 1951 11/09/23 1952  dexAMETHasone (DECADRON) injection 10 mg  ONCE         Last MAR action: Given DARLENE MESA          ED Course     Vitals:    11/09/23 1907   BP: 132/60   BP Location: RUE - Right upper extremity   Patient Position: Sitting/High-Serrano's   Pulse: 79   Resp: 18   Temp: 98.6 °F (37 °C)   TempSrc: Oral   SpO2: 99%   Weight: 77.7 kg (171 lb 6.5 oz)   LMP: 09/25/2023              Medical Decision Making  38-year-old female who presented to the emergency department from her dentist's office  with concern for a dental abscess, facial swelling.   - Differential diagnosis on ED arrival (most likely based on history and exam, broader differential considered):  Dental infection, periapical dental abscess, considered other diagnosis is including osteomyelitis (patient is currently established with dental provider who is providing care, intends to extract teeth)  - as included in HPI, case was discussed with patient's primary dentist by phone prior to her ED arrival  - IV clindamycin and IV dexamethasone given emergency department for infection and swelling, IV Toradol given for pain  - dental abscess drained as above  - Labs:  Non concerning, no leukocytosis, no left shift  - patient is well-appearing with minimal facial swelling  - discharged home with return precautions, patient already has antibiotics prescribed by her dentist.  And has established dental follow-up/ discharged with return precautions                                         Disposition       Clinical Impression and Diagnosis  9:34 PM       ED Diagnosis     Diagnosis Comment Associated Orders       Final diagnosis    Dental infection -- --          Follow Up:  Your primary dentist as instructed                Summary of your Discharge Medications      ASK your doctor about these medications      Details   cefadroxil 500 MG capsule  Commonly known as: DURICEF  Ask about: Should I take this medication?   Take 1 capsule by mouth in the morning and 1 capsule in the evening. Do all this for 10 days.            Pt is discharged to home/self care in stable condition.                Discharge 11/9/2023  9:27 PM  Aurora George discharge to home/self care.                       Elliott Gonzalez MD  11/09/23 1094     agree

## 2023-11-30 ENCOUNTER — EMERGENCY (EMERGENCY)
Facility: HOSPITAL | Age: 48
LOS: 1 days | Discharge: HOME | End: 2023-11-30
Attending: STUDENT IN AN ORGANIZED HEALTH CARE EDUCATION/TRAINING PROGRAM | Admitting: STUDENT IN AN ORGANIZED HEALTH CARE EDUCATION/TRAINING PROGRAM
Payer: COMMERCIAL

## 2023-11-30 VITALS
HEART RATE: 86 BPM | TEMPERATURE: 98 F | SYSTOLIC BLOOD PRESSURE: 105 MMHG | DIASTOLIC BLOOD PRESSURE: 65 MMHG | RESPIRATION RATE: 17 BRPM | OXYGEN SATURATION: 99 %

## 2023-11-30 DIAGNOSIS — C71.2 MALIGNANT NEOPLASM OF TEMPORAL LOBE: Chronic | ICD-10-CM

## 2023-11-30 LAB
ALBUMIN SERPL ELPH-MCNC: 4.2 G/DL — SIGNIFICANT CHANGE UP (ref 3.3–5)
ALBUMIN SERPL ELPH-MCNC: 4.2 G/DL — SIGNIFICANT CHANGE UP (ref 3.3–5)
ALP SERPL-CCNC: 86 U/L — SIGNIFICANT CHANGE UP (ref 40–120)
ALP SERPL-CCNC: 86 U/L — SIGNIFICANT CHANGE UP (ref 40–120)
ALT FLD-CCNC: 15 U/L — SIGNIFICANT CHANGE UP (ref 4–33)
ALT FLD-CCNC: 15 U/L — SIGNIFICANT CHANGE UP (ref 4–33)
ANION GAP SERPL CALC-SCNC: 9 MMOL/L — SIGNIFICANT CHANGE UP (ref 7–14)
ANION GAP SERPL CALC-SCNC: 9 MMOL/L — SIGNIFICANT CHANGE UP (ref 7–14)
AST SERPL-CCNC: 18 U/L — SIGNIFICANT CHANGE UP (ref 4–32)
AST SERPL-CCNC: 18 U/L — SIGNIFICANT CHANGE UP (ref 4–32)
BASOPHILS # BLD AUTO: 0.04 K/UL — SIGNIFICANT CHANGE UP (ref 0–0.2)
BASOPHILS # BLD AUTO: 0.04 K/UL — SIGNIFICANT CHANGE UP (ref 0–0.2)
BASOPHILS NFR BLD AUTO: 0.3 % — SIGNIFICANT CHANGE UP (ref 0–2)
BASOPHILS NFR BLD AUTO: 0.3 % — SIGNIFICANT CHANGE UP (ref 0–2)
BILIRUB SERPL-MCNC: 0.2 MG/DL — SIGNIFICANT CHANGE UP (ref 0.2–1.2)
BILIRUB SERPL-MCNC: 0.2 MG/DL — SIGNIFICANT CHANGE UP (ref 0.2–1.2)
BUN SERPL-MCNC: 17 MG/DL — SIGNIFICANT CHANGE UP (ref 7–23)
BUN SERPL-MCNC: 17 MG/DL — SIGNIFICANT CHANGE UP (ref 7–23)
CALCIUM SERPL-MCNC: 9.2 MG/DL — SIGNIFICANT CHANGE UP (ref 8.4–10.5)
CALCIUM SERPL-MCNC: 9.2 MG/DL — SIGNIFICANT CHANGE UP (ref 8.4–10.5)
CHLORIDE SERPL-SCNC: 107 MMOL/L — SIGNIFICANT CHANGE UP (ref 98–107)
CHLORIDE SERPL-SCNC: 107 MMOL/L — SIGNIFICANT CHANGE UP (ref 98–107)
CO2 SERPL-SCNC: 22 MMOL/L — SIGNIFICANT CHANGE UP (ref 22–31)
CO2 SERPL-SCNC: 22 MMOL/L — SIGNIFICANT CHANGE UP (ref 22–31)
CREAT SERPL-MCNC: 0.97 MG/DL — SIGNIFICANT CHANGE UP (ref 0.5–1.3)
CREAT SERPL-MCNC: 0.97 MG/DL — SIGNIFICANT CHANGE UP (ref 0.5–1.3)
EGFR: 72 ML/MIN/1.73M2 — SIGNIFICANT CHANGE UP
EGFR: 72 ML/MIN/1.73M2 — SIGNIFICANT CHANGE UP
EOSINOPHIL # BLD AUTO: 0 K/UL — SIGNIFICANT CHANGE UP (ref 0–0.5)
EOSINOPHIL # BLD AUTO: 0 K/UL — SIGNIFICANT CHANGE UP (ref 0–0.5)
EOSINOPHIL NFR BLD AUTO: 0 % — SIGNIFICANT CHANGE UP (ref 0–6)
EOSINOPHIL NFR BLD AUTO: 0 % — SIGNIFICANT CHANGE UP (ref 0–6)
GLUCOSE SERPL-MCNC: 115 MG/DL — HIGH (ref 70–99)
GLUCOSE SERPL-MCNC: 115 MG/DL — HIGH (ref 70–99)
HCT VFR BLD CALC: 36.1 % — SIGNIFICANT CHANGE UP (ref 34.5–45)
HCT VFR BLD CALC: 36.1 % — SIGNIFICANT CHANGE UP (ref 34.5–45)
HGB BLD-MCNC: 12.1 G/DL — SIGNIFICANT CHANGE UP (ref 11.5–15.5)
HGB BLD-MCNC: 12.1 G/DL — SIGNIFICANT CHANGE UP (ref 11.5–15.5)
IANC: 12.18 K/UL — HIGH (ref 1.8–7.4)
IANC: 12.18 K/UL — HIGH (ref 1.8–7.4)
IMM GRANULOCYTES NFR BLD AUTO: 0.4 % — SIGNIFICANT CHANGE UP (ref 0–0.9)
IMM GRANULOCYTES NFR BLD AUTO: 0.4 % — SIGNIFICANT CHANGE UP (ref 0–0.9)
LYMPHOCYTES # BLD AUTO: 1.53 K/UL — SIGNIFICANT CHANGE UP (ref 1–3.3)
LYMPHOCYTES # BLD AUTO: 1.53 K/UL — SIGNIFICANT CHANGE UP (ref 1–3.3)
LYMPHOCYTES # BLD AUTO: 10.7 % — LOW (ref 13–44)
LYMPHOCYTES # BLD AUTO: 10.7 % — LOW (ref 13–44)
MCHC RBC-ENTMCNC: 29.9 PG — SIGNIFICANT CHANGE UP (ref 27–34)
MCHC RBC-ENTMCNC: 29.9 PG — SIGNIFICANT CHANGE UP (ref 27–34)
MCHC RBC-ENTMCNC: 33.5 GM/DL — SIGNIFICANT CHANGE UP (ref 32–36)
MCHC RBC-ENTMCNC: 33.5 GM/DL — SIGNIFICANT CHANGE UP (ref 32–36)
MCV RBC AUTO: 89.1 FL — SIGNIFICANT CHANGE UP (ref 80–100)
MCV RBC AUTO: 89.1 FL — SIGNIFICANT CHANGE UP (ref 80–100)
MONOCYTES # BLD AUTO: 0.44 K/UL — SIGNIFICANT CHANGE UP (ref 0–0.9)
MONOCYTES # BLD AUTO: 0.44 K/UL — SIGNIFICANT CHANGE UP (ref 0–0.9)
MONOCYTES NFR BLD AUTO: 3.1 % — SIGNIFICANT CHANGE UP (ref 2–14)
MONOCYTES NFR BLD AUTO: 3.1 % — SIGNIFICANT CHANGE UP (ref 2–14)
NEUTROPHILS # BLD AUTO: 12.18 K/UL — HIGH (ref 1.8–7.4)
NEUTROPHILS # BLD AUTO: 12.18 K/UL — HIGH (ref 1.8–7.4)
NEUTROPHILS NFR BLD AUTO: 85.5 % — HIGH (ref 43–77)
NEUTROPHILS NFR BLD AUTO: 85.5 % — HIGH (ref 43–77)
NRBC # BLD: 0 /100 WBCS — SIGNIFICANT CHANGE UP (ref 0–0)
NRBC # BLD: 0 /100 WBCS — SIGNIFICANT CHANGE UP (ref 0–0)
NRBC # FLD: 0 K/UL — SIGNIFICANT CHANGE UP (ref 0–0)
NRBC # FLD: 0 K/UL — SIGNIFICANT CHANGE UP (ref 0–0)
PLATELET # BLD AUTO: 205 K/UL — SIGNIFICANT CHANGE UP (ref 150–400)
PLATELET # BLD AUTO: 205 K/UL — SIGNIFICANT CHANGE UP (ref 150–400)
POTASSIUM SERPL-MCNC: 4.7 MMOL/L — SIGNIFICANT CHANGE UP (ref 3.5–5.3)
POTASSIUM SERPL-MCNC: 4.7 MMOL/L — SIGNIFICANT CHANGE UP (ref 3.5–5.3)
POTASSIUM SERPL-SCNC: 4.7 MMOL/L — SIGNIFICANT CHANGE UP (ref 3.5–5.3)
POTASSIUM SERPL-SCNC: 4.7 MMOL/L — SIGNIFICANT CHANGE UP (ref 3.5–5.3)
PROT SERPL-MCNC: 7 G/DL — SIGNIFICANT CHANGE UP (ref 6–8.3)
PROT SERPL-MCNC: 7 G/DL — SIGNIFICANT CHANGE UP (ref 6–8.3)
RBC # BLD: 4.05 M/UL — SIGNIFICANT CHANGE UP (ref 3.8–5.2)
RBC # BLD: 4.05 M/UL — SIGNIFICANT CHANGE UP (ref 3.8–5.2)
RBC # FLD: 13.4 % — SIGNIFICANT CHANGE UP (ref 10.3–14.5)
RBC # FLD: 13.4 % — SIGNIFICANT CHANGE UP (ref 10.3–14.5)
SODIUM SERPL-SCNC: 138 MMOL/L — SIGNIFICANT CHANGE UP (ref 135–145)
SODIUM SERPL-SCNC: 138 MMOL/L — SIGNIFICANT CHANGE UP (ref 135–145)
WBC # BLD: 14.24 K/UL — HIGH (ref 3.8–10.5)
WBC # BLD: 14.24 K/UL — HIGH (ref 3.8–10.5)
WBC # FLD AUTO: 14.24 K/UL — HIGH (ref 3.8–10.5)
WBC # FLD AUTO: 14.24 K/UL — HIGH (ref 3.8–10.5)

## 2023-11-30 PROCEDURE — 72125 CT NECK SPINE W/O DYE: CPT | Mod: 26,MD

## 2023-11-30 PROCEDURE — 99285 EMERGENCY DEPT VISIT HI MDM: CPT

## 2023-11-30 PROCEDURE — 70450 CT HEAD/BRAIN W/O DYE: CPT | Mod: 26,MD

## 2023-11-30 PROCEDURE — 73060 X-RAY EXAM OF HUMERUS: CPT | Mod: 26,RT

## 2023-11-30 PROCEDURE — 73030 X-RAY EXAM OF SHOULDER: CPT | Mod: 26,RT

## 2023-11-30 PROCEDURE — 73030 X-RAY EXAM OF SHOULDER: CPT | Mod: 26,77,RT

## 2023-11-30 PROCEDURE — 70486 CT MAXILLOFACIAL W/O DYE: CPT | Mod: 26,MD

## 2023-11-30 RX ORDER — LIDOCAINE HCL 20 MG/ML
20 VIAL (ML) INJECTION ONCE
Refills: 0 | Status: COMPLETED | OUTPATIENT
Start: 2023-11-30 | End: 2023-11-30

## 2023-11-30 RX ORDER — FENTANYL CITRATE 50 UG/ML
25 INJECTION INTRAVENOUS ONCE
Refills: 0 | Status: DISCONTINUED | OUTPATIENT
Start: 2023-11-30 | End: 2023-11-30

## 2023-11-30 RX ORDER — MORPHINE SULFATE 50 MG/1
4 CAPSULE, EXTENDED RELEASE ORAL ONCE
Refills: 0 | Status: DISCONTINUED | OUTPATIENT
Start: 2023-11-30 | End: 2023-11-30

## 2023-11-30 RX ORDER — ETOMIDATE 2 MG/ML
13 INJECTION INTRAVENOUS ONCE
Refills: 0 | Status: COMPLETED | OUTPATIENT
Start: 2023-11-30 | End: 2023-11-30

## 2023-11-30 RX ORDER — FENTANYL CITRATE 50 UG/ML
50 INJECTION INTRAVENOUS ONCE
Refills: 0 | Status: DISCONTINUED | OUTPATIENT
Start: 2023-11-30 | End: 2023-11-30

## 2023-11-30 RX ORDER — ACETAMINOPHEN 500 MG
1000 TABLET ORAL ONCE
Refills: 0 | Status: COMPLETED | OUTPATIENT
Start: 2023-11-30 | End: 2023-11-30

## 2023-11-30 RX ORDER — KETOROLAC TROMETHAMINE 30 MG/ML
15 SYRINGE (ML) INJECTION ONCE
Refills: 0 | Status: DISCONTINUED | OUTPATIENT
Start: 2023-11-30 | End: 2023-11-30

## 2023-11-30 RX ADMIN — Medication 400 MILLIGRAM(S): at 19:43

## 2023-11-30 RX ADMIN — FENTANYL CITRATE 25 MICROGRAM(S): 50 INJECTION INTRAVENOUS at 21:46

## 2023-11-30 RX ADMIN — MORPHINE SULFATE 4 MILLIGRAM(S): 50 CAPSULE, EXTENDED RELEASE ORAL at 19:43

## 2023-11-30 RX ADMIN — Medication 20 MILLILITER(S): at 22:19

## 2023-11-30 RX ADMIN — FENTANYL CITRATE 25 MICROGRAM(S): 50 INJECTION INTRAVENOUS at 22:13

## 2023-11-30 NOTE — ED PROVIDER NOTE - PATIENT PORTAL LINK FT
You can access the FollowMyHealth Patient Portal offered by [facility] by registering at the following website: [email or web address]. By joining [employer]’s FollowMyHealth portal, you will also be able to view your health information using other applications (apps) compatible with our system.

## 2023-11-30 NOTE — ED ADULT NURSE NOTE - NSFALLRISKINTERV_ED_ALL_ED
Assistance OOB with selected safe patient handling equipment if applicable/Assistance with ambulation/Communicate fall risk and risk factors to all staff, patient, and family/Monitor gait and stability/Provide visual cue: yellow wristband, yellow gown, etc/Reinforce activity limits and safety measures with patient and family/Call bell, personal items and telephone in reach/Instruct patient to call for assistance before getting out of bed/chair/stretcher/Non-slip footwear applied when patient is off stretcher/[place] to call system/Physically safe environment - no spills, clutter or unnecessary equipment/Purposeful Proactive Rounding/Room/bathroom lighting operational, light cord in reach

## 2023-11-30 NOTE — ED ADULT NURSE NOTE - OBJECTIVE STATEMENT
Patient presents to ED for right shoulder pain s/p fall. She is A&Ox4, in no acute distress, calm, cooperative. States no head injury, no LOC. She reports right arm very painful with movement. Denies CP, dizziness, SOB, dyspnea, N/V, fevers. Respirations even, unlabored on room air. History of brain cancer, CVA, spinal surgery, hypothyroidism, frequent right shoulder dislocation. 20G IV placed left AC, labs drawn and sent. Medicated as ordered.

## 2023-11-30 NOTE — ED PROVIDER NOTE - NSFOLLOWUPCLINICS_GEN_ALL_ED_FT
[facility]  Orthopedic Surgery  [address], 3rd & 4th [place] [address]  Phone: [phone]  Fax:     [facility]  Orthopedic Surgery  [address] [address]  Phone: [phone]  Fax:

## 2023-11-30 NOTE — ED PROVIDER NOTE - OBJECTIVE STATEMENT
48F PMH of anaplastic astrocytoma, brain cancer, CVA, asthma, hypothyroid, seizure, R shoulder dislocation x3 48F PMH of anaplastic astrocytoma, brain cancer, CVA, asthma, hypothyroid, seizure, R shoulder dislocation x3 Here for fall x 2 with residual right shoulder pain.  As per patient the fall today mechanical in nature patient attempting to reach for something fell forward and hit her face right shoulder against wall and then on the ground.  Patient denies any preceding chest pain, dizziness, shortness of breath or postdromal symptoms. Is not on any blood thinners.  able to ambulate after event.  Came to the ED because right shoulder pain unbearable however did not take any oral meds prior to arrival.  Denies any neck pain,  midline back pain or difficulty ambulating after event.  as per patient partner at bedside patient is at baseline mental status currently.

## 2023-11-30 NOTE — ED PROVIDER NOTE - CLINICAL SUMMARY MEDICAL DECISION MAKING FREE TEXT BOX
48-year-old female past medical history as above here for mechanical fall x 2.  Vital signs stable, physical exam as above.  Concern for right shoulder fracture or dislocation given history and mechanism of injury area of pain.  Will do CT head, CT max face, CT C-spine, imaging of the right upper extremity and reassess.

## 2023-11-30 NOTE — ED ADULT NURSE REASSESSMENT NOTE - NS ED NURSE REASSESS COMMENT FT1
Report received from RANCHO Cordero, A&O x 4, offers complaints of 10/10 pain to R shoulder. RR equal and unlabored, VS stable, no acute distress noted. MDs at bedside for further evaluation and reduction. Plan or conscious sedation. Denies chest pain, SOB, HA, dizziness, n/v/d. Safety maintained, comfort provided, medicated as per EMAR, awaiting conscious sedation at this time.

## 2023-11-30 NOTE — ED PROVIDER NOTE - NSFOLLOWUPINSTRUCTIONS_ED_ALL_ED_FT
Return to the emergency room for any new or worsening symptoms.      Follow-up with orthopedics, information provided.      See attached information on shoulder dislocations.      You can take 400 mg of Motrin up to 3 times a day with food and 650 mg of Tylenol up to 4 times a day for your pain.    Shoulder Dislocation    Three images of the anatomy of the shoulder. One is normal. The other two are dislocated.  A shoulder dislocation happens when the upper arm bone (humerus) moves out of the shoulder joint. The shoulder joint is the part of the shoulder where the humerus, shoulder blade (scapula), and collarbone (clavicle) meet.    What are the causes?  This condition is often caused by:  A fall.  A hard, direct hit to the shoulder.  A forceful movement of the shoulder.  What increases the risk?  You are more likely to develop this condition if you play sports.    What are the signs or symptoms?  The upper body showing a dislocated shoulder.  Symptoms of this condition include:  Deformity of the shoulder.  Severe pain.  Inability to move the shoulder.  Numbness, weakness, or tingling in your neck or down your arm.  Bruising or swelling around your shoulder.  How is this diagnosed?  This condition is diagnosed with a physical exam. After the exam, tests may be done to check for related problems. Tests may include:  X-ray. This may be done to check for broken bones.  MRI. This may be done to check for damage to the tissues around the shoulder.  Electromyogram. This may be done to check for nerve damage.  How is this treated?  This condition is treated with a procedure to place the humerus back in the joint. This procedure is called a reduction. There are two types of reduction:  Closed reduction. The humerus is placed back in the joint without surgery. The health care provider uses his or her hands to guide the bone back into place.  Open reduction. Surgery is done to place the humerus back in the joint. An open reduction may be recommended if:  You have a weak shoulder joint or weak ligaments.  You have had more than one shoulder dislocation.  The nerves or blood vessels around your shoulder have been damaged.  After reduction, your shoulder and arm will be placed in a brace or sling to prevent it from moving.    After the brace or sling is removed, you will have physical therapy to help improve the range of motion in your shoulder joint.    Follow these instructions at home:  Medicines    Take over-the-counter and prescription medicines only as told by your health care provider.  Ask your health care provider if the medicine prescribed to you:  Requires you to avoid driving or using machinery.  Can cause constipation. You may need to take these actions to prevent or treat constipation:  Drink enough fluid to keep your urine pale yellow.  Take over-the-counter or prescription medicines.  Eat foods that are high in fiber, such as beans, whole grains, and fresh fruits and vegetables.  Limit foods that are high in fat and processed sugars, such as fried or sweet foods.  If you have a brace or sling:    Wear the brace or sling as told by your health care provider. Remove it only as told by your health care provider.  Loosen the brace or sling if your fingers tingle, become numb, or turn cold and blue.  Keep the brace or sling clean.  If the brace or sling is not waterproof:  Do not let it get wet.  Cover it with a watertight covering when you take a bath or shower.  Managing pain, stiffness, and swelling    Bag of ice on a towel on the skin.  If directed, put ice on the injured area.  If you have a removable brace or sling, remove it as told by your health care provider.  Put ice in a plastic bag.  Place a towel between your skin and the bag.  Leave the ice on for 20 minutes, 2–3 times per day.  Move your fingers often to reduce stiffness and swelling.  Raise (elevate) the injured area above the level of your heart while you are sitting or lying down.  Activity    Do not lift your arm above shoulder level until your health care provider approves.  Do not lift anything until your health care provider says that it is safe.  Do not push or pull things until your health care provider approves.  Return to your normal activities as told by your health care provider. Ask your health care provider what activities are safe for you.  Perform range-of-motion exercises only as told by your health care provider.  Exercise your hand by squeezing a soft ball. This helps to decrease stiffness and swelling in your hand and wrist.  General instructions    Do not drive while wearing a brace or sling on a hand that you use for driving. Ask your health care provider when it is safe to drive after the brace or sling is removed.  Do not take baths, swim, or use a hot tub until your health care provider approves. Ask your health care provider if you may take showers. You may only be allowed to take sponge baths.  Do not use any products that contain nicotine or tobacco, such as cigarettes, e-cigarettes, and chewing tobacco. These can delay healing. If you need help quitting, ask your health care provider.  Keep all follow-up visits as told by your health care provider. This is important.  Contact a health care provider if:  Your brace or sling gets damaged.  Get help right away if:  Your pain gets worse rather than better.  You lose feeling in your arm or hand.  Your arm or hand becomes white and cold.  Summary  A shoulder dislocation happens when the upper arm bone moves out of the shoulder joint.  It is usually caused by a fall, a strong hit to the shoulder, or a forceful movement of the shoulder.  It causes severe pain, inability to move the shoulder, numbness, weakness, or tingling.  This condition is treated with either closed or open reduction. You will also be given a brace or sling. You will do exercises to increase your shoulder's range of motion.  Contact a health care provider if your brace or sling gets damaged. Get help right away if your pain gets worse, you lose feeling in your arm or hand, or your arm or hand becomes white or cold.  This information is not intended to replace advice given to you by your health care provider. Make sure you discuss any questions you have with your health care provider.

## 2023-11-30 NOTE — ED ADULT TRIAGE NOTE - CHIEF COMPLAINT QUOTE
Pt arrives ambulatory to triage c/o right shoulder, back pain after falling while trying to push herself up against a wall. Denies hitting head or LOC. PMHx: brain cancer, CVA, asthma, hypothyroid, seizure, R shoulder dislocation x3.

## 2023-11-30 NOTE — ED PROVIDER NOTE - ATTENDING CONTRIBUTION TO CARE
I have personally performed a face to face medical and diagnostic evaluation of the patient. I have discussed with and reviewed the Resident's note and agree with the History, ROS, Physical Exam and MDM unless otherwise indicated. A brief summary of my personal evaluation and impression can be found below.    48-year-old female past medical history as above here for mechanical fall x 2.   no prodromal symptoms, no LOC.  Patient not on anticoagulation.  Complaining of right shoulder pain and scapular pain.  Contrary to triage note patient does not have back pain, refers to shoulder.   On exam Vital signs stable,  some bruising below the right orbit, extraocular movements intact and do not elicit pain.  No bony tenderness of the orbits.  Patient neuro intact throughout.   Right shoulder with decreased range of motion and tenderness.  Neurovascularly intact throughout all extremities.  Patient neuro intact.  Attributes the bruising under the eyes to be secondary to glasses hitting her face, but denies any other mechanism. Concern for right shoulder fracture or dislocation given history and mechanism of injury area of pain.  Will do CT head, CT max face, CT C-spine, imaging of the right upper extremity and reassess   To dispo. Brigida Carranza, ED Attending

## 2023-11-30 NOTE — ED ADULT NURSE NOTE - NSICDXPASTMEDICALHX_GEN_ALL_CORE_FT
[facility]  HEMATOLOGY/ONCOLOGY PROGRESS NOTE    CHIEF COMPLAINT:  Breast cancer    HISTORY OF PRESENT ILLNESS:  Derrick Dave is a 67 year old female seen today for breast cancer.  She presented with a palpable lump in the 10-11 o'clock position of the right breast and underwent diagnostic imaging on 11/18/2019.      Energy is better and she notes no recurrent symptoms of iron deficiency. No blood in the stools or urine.    There are no unexplained systemic symptoms of concern such as unexplained pains, dyspnea, cough, abdominal discomfort, unintentional weight loss. No LE edema, chest discomfort or KAISER. She is walking daily up to 8000 steps.    She has headaches that respond to chiropractic adjustment. They are now located in the left neck and parietal region and are mild.    There are no recent fevers or infectious symptoms.     She is tolerating anastrazole with vasomotor symptoms. She takes magnesium twice daily and fish oil.    There is ongoing neuropathy in the fingers and toes that is improved from prior.    She and her family are taking precautions against SARS-CoV-2.    ONCOLOGIC HISTORY:  1.  11/18/2019:  Right-sided diagnostic mammography for palpable lump revealed a subtle nodular density in the upper outer aspect of the right breast with ultrasound demonstrating a hypoechoic oval lesion measuring 2.4 x 1.5 x 2.5 cm.  No adenopathy was seen in the right axilla.  2.  11/29/2019:  Breast, right, 10 o'clock, needle biopsies:     - Benign fibrous breast tissue with focal hemosiderin deposition    - biopsy results felt discordant with imaging.  3.  01/10/2020:  MRI of the breast revealed 10 o'clock position and 9.5 cm from the nipple there is a 2.2 x 2.1 x 2.2 cm heterogeneous enhancing irregular mass mass, with with mixed enhancement kinetics (81% persistent, 17% plateau, and 1% washout).  Two abnormal lymph nodes with cortical thickening are seen in the right axilla. The blane  measures 6-7 mm in thickness. No suspicious internal mammary or left axillary nodes  4.  01/23/2020:     A: Right breast, core biopsies:     - Invasive ductal carcinoma, grade 2 (0.5 cm in greatest dimension)     - estrogen receptor positive.  80%  - progesterone receptor negative.  0%  - HER2 positive (3+)     B: Lymph node, right axilla, core biopsy:  - Lymphoid tissue with no evidence for metastatic carcinoma    5. 2/3/20:  Brain MRI for headaches with no evidence of metastatic disease   6.  2/10/20:  TCHP.  Treatment interrupted due to port site dehiscence and bacteremia.  Completed cycle 6: 6/11/20. Pertuzumab held for 2 cycles prior to surgery to reduce wound healing complications and after to allow wound healing.  Maintenance therapy given for 1 year, completing treatment 01/28/2021.  7. 7/28/20: Breast, right partial mastectomy with oncoplastic reconstruction:  -Benign portions of breast parenchyma with areas of scar and chronic inflammation, there is no evidence for residual carcinoma, see microscopic description, ypT0 N0  -0/2 SLN involved  -ypT0 pN0(sn) cM0  8. [facility] genetic testing negative for:   1. Hereditary Breast and Ovarian Cancer Syndrome (BRCA1 and BRCA2)   2. Moderate risk hereditary cancer genes: RAFAELA, PALB2, CHEK2   3. Hereditary diffuse gastric cancer syndrome (CDH1)   4. PTEN-related hamartoma tumor syndrome (PTEN)   5. Li-Fraumeni syndrome (TP53)   6. Peutz-Jegher syndrome (STK11)  9. Radiation (Dr. Bhandari) completed 10/16/20.  10. 11/5/20: Anastrazole (dexascan with normal BMD 2/2019)  11.  12/22/2020: Breast, left, mammoplasty:   -Benign breast tissue  -One lymph node negative for metastatic carcinoma    MEDICATIONS AND ALLERGIES:    Current Outpatient Medications   Medication Sig Dispense Refill   • losartan (COZAAR) 50 MG tablet TAKE 1 TABLET BY MOUTH DAILY 90 tablet 0   • anastrozole (ARIMIDEX) 1 MG tablet TAKE 1 TABLET BY MOUTH DAILY 30 tablet 3   • pantoprazole (PROTONIX) 40 MG  tablet TAKE 1 TABLET BY MOUTH DAILY BEFORE BREAKFAST AND 30 TO 60 MINUTES BEFORE BREAKFAST 90 tablet 0   • metoPROLOL succinate (TOPROL-XL) 100 MG 24 hr tablet TAKE 1 TABLET BY MOUTH DAILY 90 tablet 0   • levothyroxine 88 MCG tablet Take 1 tablet by mouth daily. 90 tablet 3   • Probiotic Product (PROBIOTIC DAILY PO) Take 1 capsule by mouth daily.      • acetaminophen (TYLENOL) 325 MG tablet Take 2 tablets by mouth every 6 hours as needed for Pain. (Patient taking differently: Take 1,000 mg by mouth every 6 hours as needed for Pain.)     • desonide (DESOWEN) 0.05 % lotion Apply topically 2 times daily. as needed for rash (Patient taking differently: Apply  topically as needed. as needed for rash) 1 Bottle 1   • Nutritional Supplements (NUTRITIONAL SUPPLEMENT PO) Take 1 Dose by mouth daily. Pt states she takes cumin spice daily     • Magnesium 500 MG Tab Take 500 mg by mouth daily.      • Omega-3 Fatty Acids (FISH OIL PO) Take 2 capsules by mouth daily.      • Cholecalciferol (VITAMIN D) 2000 UNITS tablet Take 2,000 Units by mouth daily. 100 tablet 3   • B Complex Vitamins (B COMPLEX-B12 PO) Take 1 tablet by mouth daily.        No current facility-administered medications for this visit.     ALLERGIES:   Allergen Reactions   • Clonidine RASH     not drug, was the patch,    • Sulfa Antibiotics SWELLING and PRURITUS     tongue       PROBLEM LIST:  Patient Active Problem List   Diagnosis   • Diffuse cystic mastopathy   • Essential hypertension, benign   • Chronic headaches   • Postoperative hypothyroidism   • Malignant neoplasm of upper-outer quadrant of right breast in female, estrogen receptor positive (CMS/HCC)   • Malignant neoplasm of upper-outer quadrant of right breast in female, estrogen receptor positive (CMS/HCC)   • NOT SEEN   • Viral upper respiratory tract infection   • Paronychia of great toe, right   • Ingrown right greater toenail   • S/P oncoplastic breast reconstruction, right   • Hemorrhoids         HISTORIES:  Past medical history, surgical history, family history, and social history were reviewed and updated.  Past Medical History:   Diagnosis Date   • Benign neoplasm of colon 01/2006    tubular adenoma and acute colitis on bx.    • Benign neoplasm of colon 07/10/2012    hyperplastic polyps   • Biceps tendinitis of right upper extremity 05/04/2018   • Carpal tunnel syndrome on both sides 02/2015    mild    • Cervical spinal stenosis     mod c6-6 mild c5-6.    • Chronic fatigue     negative organic workup (TSH, CBC, CMP)   • Diffuse cystic mastopathy     Fibrocystic Breast Disease   • Elevated CPK     referred to neuro   • Essential hypertension, benign    • Hemorrhoids 06/28/2021    pre colonoscopy   • High cholesterol    • Hypothyroidism associated with surgical procedure    • Iron deficiency anemia, unspecified    • Left knee DJD    • Leiomyoma of uterus, unspecified     Fibroids   • Malignant neoplasm (CMS/HCC)    • Memory deficit     abnormal MOCA in the past; prior MRI demonstrated abnormalities in the frontoparietal lobes; patient yet to schedule neuropsych appt   • Mitral valve disorders(424.0) 01/2005    mild mr on stress echo.    • Obesity, unspecified    • PONV (postoperative nausea and vomiting)    • Sciatica of right side    • Special screening for malignant neoplasms, colon 01/10/2006     colon, cecum: acute colitis & colon descending polyp & colon transverse results: tubular adenoma.   • Spinal stenosis of lumbar region     L4-L5 with neuroforaminal narrowing; managed with prn tylenol/nsaids and chiropracty   • Staphylococcus aureus bacteremia 04/2020    infected port; s/p removal by IR   • Tension headache    • Thyroid nodule 03/2019   • Tubular adenoma of colon 08/08/2017   • Unspecified vitamin D deficiency 04/2006   • Vertigo    • Vitreous degeneration      Past Surgical History:   Procedure Laterality Date   • Biopsy of breast, incisional  1999    Breast Biopsy,R   • Biopsy or  excise cervical lesion  2001    endocervical polyp benign   • Breast reconstruc w othr techniq Right 2020    right breast oncoplastic reduction, Dr. Hansen   • Breast reduction Left 2020    left breast reduction for symmetry, Dr. Hansen   •  delivery+postpartum care       x3   •  section, classic     • Colonoscopy diagnostic  2021    Colon 5yrs,hemorrhoids,H/o Colon polyps,.   • Colonoscopy remove lesions by snare  2009    duranuiber - tubular adenoma x 2 , hyperplastic polyp x 1  fu 3 yrs   • Colonoscopy remove lesions hot biopsy forceps  07/10/2012    Kluiber - Rech 5 yrs   • Colonoscopy remove lesions hot biopsy forceps  2017    Kluiber-TA x 2, LA/HP x 1, HP x 1; recheck 5 years    • Colonoscopy w biopsy  01/10/2006    Dr. Alexandre cecum bx: acute colitis & colon, descending, bx & colon transverse bx resluts: tubular adenoma.   • Echo heart transesoph acq interp  10/2000    WNL, done for TIA   • Esophagogastroduodenoscopy transoral flex w/bx single or mult  2021    Duod BX>normal path,RAMÓN,.   • Exc sweat gland lesn axill,simpl      hidradenitis,L   • Flexible sigmoidoscopy diagnostic include specimens  2004    negative,    • Gi image intraluminal thru ileum  2021    NO lesions on pill cam. RAMÓN..   • Hysterectomy     • Laparoscopy,tubal cautery      Tubal Ligation   • Mastectomy partial Right 2020    MASTECTOMY, PARTIAL RIGHT, BIOPSY, LYMPH NODE, SENTINEL RIGHT,    LYMPHADENECTOMY, AXILLARY RIGHT   • Remv pilonidal lesion simple  97, 74   • Repair ing hernia,5+y/o,reducibl  child    Hernia, inguinal, bilateral   • Repair umbilical janice,5+y/o,reduc      Hernia, umbilical, >4 yrs of age   • Thyroid lobectomy,unilat      L   • Thyroid lobectomy,unilat      R   • Total abdom hysterectomy  2002    LAILA with Ovaries Intact (menorrhagia, myomata)   • Us breast biopsy right 1 lesion  Right 11/29/2019    rt breast biopsy 1000 clip placed     Family History   Problem Relation Age of Onset   • Cancer, Lung Father 76        smoker    • Genitourinary Mother         kidney disease   • Hypertension Mother    • Thyroid Mother         goiter   • Coronary Artery Disease Mother         enlarged due to HTN   • Cancer, Breast Sister 74        breast   • Cancer Brother 54        Neuroendrotrine tumor on top of pancreas   • Patient is unaware of any medical problems Son    • Hypertension Son    • Hypertension Son    • Diabetes Sister         renal transplant   • Respiratory Sister         sarcoidosis   • Hypertension Sister    • Other Sister         muscle cramps   • Hypertension Sister    • Thyroid Sister         hyperthyroidism   • Thyroid Sister    • Cancer Maternal Aunt 77        unknown type   • Cancer, Breast Paternal Aunt     --age uncertain  Brother had tumor on pancreas  Sister with breast cancer was a half sister through her mother    Social History     Tobacco Use   • Smoking status: Never Smoker   • Smokeless tobacco: Never Used   Vaping Use   • Vaping Use: never used   Substance Use Topics   • Alcohol use: Yes     Alcohol/week: 0.0 - 0.8 standard drinks     Comment: Rare use    • Drug use: No   Lives in [place].  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   with 3 children.  Exercise:  Irregular.    REVIEW OF SYSTEMS:    All other systems are reviewed and are negative except as documented in the history of present illness.    PHYSICAL EXAM:  Vital Signs:   Oncology Encounter Vitals [11/08/21 1119]   ONC OP Encounter Vitals Group      BP 97/54      Heart Rate 91      Resp 16      Temp 98 °F (36.7 °C)      Temp src Oral      SpO2 98 %      Weight 206 lb 3.2 oz (93.5 kg)      Height       Pain Score 2      Pain Location       Pain Education?       BSA (Calculated - m2) - [employer]       BMI (Calculated)        QOPI Data:     Oral Chemo: NA.    Psychiatric ROS: Negative  for change in affect, anxiety, depression, insomnia, mentation or sleep disturbance.    ECOG Performance Status:   ECOG [11/08/21 1119]   ECOG Performance Status 0     General: The patient is alert, well-developed, well-nourished, no distress.  Skin: Warm, normal color, normal texture, normal turgor and without rash.  Head: Normocephalic, atraumatic.  Eyes: Normal conjunctivae and sclerae. Pupils equal, round.  Cardiovascular: Regular rate and rhythm, no murmurs, gallops or rubs.  Respiratory: Normal respiratory effort. Clear to auscultation. No wheezes, rales, or rhonchi.  Breasts: Left breast with changes consistent with reduction mammoplasty.  Right breast with surgical change and scar associated tenderness.  There are no significant nodules or suspicious skin changes concerning for carcinoma.    Abdomen: Abdomen is soft and non-tender with active bowel sounds. There is no detected hepatosplenomegaly, masses, or ascites.  Extremities: No clubbing, no cyanosis, no edema and normal muscle tone and development bilaterally.  Lymph: No cervical, supraclavicular, axillary lymphadenopathy.  Neurologic: Motor strength normal, coordination normal, no tremor noted.  Psychiatric: Cooperative. Appropriate mood and affect. Normal judgment.      LABORATORY DATA:  Recent Labs   Lab 08/09/21  1033   WBC 7.6   RBC 4.15   HGB 12.8   HCT 39.7   MCV 95.7      Absolute Neutrophils 4.9   Absolute Lymphocytes 1.6   Absolute Monocytes 0.8   Absolute Eosinophils  0.3   Absolute Basophils 0.0     Recent Labs   Lab 08/09/21  1015 01/07/21  0957   Glucose 114* 152*   Sodium 145 138   Potassium 3.9 3.2*   Chloride 109* 102   Carbon Dioxide 25 26   BUN 13 11   Creatinine 0.84 0.76   Calcium 8.9 9.1   Magnesium  --  1.8   Protein, Total 7.1 7.1   Albumin 3.5* 3.2*   GOT/AST 26 26   Alkaline Phosphatase 81 73   GPT 22 33     Recent Labs   Lab 08/09/21  1015   Anion Gap 15   Globulin 3.6   Bilirubin, Total 0.4       IMAGING  STUDIES:  Imaging studies reviewed. The pertinent positives are DEXA scan 07/22/2021 with normal bone mineral density.    ASSESSMENT:  1.  Right breast HER2 positive invasive ductal carcinoma:  She is doing well and completed anti HER2 therapy.    She is tolerating anastrozole therapy well and will continue with it but if hot flashes remain an issue, we discussed potentially changing to exemestane. Dexascan within normal limits.   The benefit a focus a mainly plant based diet, moderation of alcohol to consume less than 1 drink per day on average, incorporation of lean meats into the diet, avoiding excess sugar and obtaining exercise for a goal 2-1/2 hours per week was previously described.  In various studies, these can decrease the risk of disease recurrence by up to 20-37%.  2.  Atypical headaches:  An MRI of the brain was previously ordered and showed no evidence of metastatic disease.  She will call me if her current headaches, which are mild, progress in severity or frequency.  3.  Family history of malignancy:  Genetic counseling/testing negative for a pathogenic gene mutation.  4.  COVID-19:  We discussed precautions to take in the setting of the COVID-19 pandemic including social distancing, assiduously handwashing, and masking, among others.  She completed her vaccine series and booster.  5.  Chemo induced neuropathy:  Improved overall.  6.  Breast tenderness:  She will continue self massage.      The patient indicated understanding of the diagnosis and agreed with the plan of care.   PAST MEDICAL HISTORY:  Anxiety     Asthma never intubated or hospitalized for ot, uses symbicort and proventil    Astrocytoma Temporal lobe    Chronic sinusitis     Dislocation of shoulder x 4 -  2000 to 2005    GERD (gastroesophageal reflux disease)     Hypothyroidism     Obesity (BMI 30-39.9)     Spine fracture lumbar 2005

## 2023-11-30 NOTE — ED PROVIDER NOTE - PROGRESS NOTE DETAILS
Sincere, PGY3: S/p conscious sedation and reduction of right shoulder. Patient tolerated procedure well. Patient has tolerated PO. Will discharge with strict return precautions, instructions to keep sling in place and ortho follow up.

## 2023-11-30 NOTE — ED PROVIDER NOTE - PHYSICAL EXAMINATION
Gen: WDWN, NAD  HEENT: EOMI, no nasal discharge, mucous membranes moist,   Right-sided maxillary tenderness no Kumar sign/raccoon sign  CV: RRR, +S1/S2, no M/R/G  Resp: CTAB, no W/R/R  GI: Abdomen soft non-distended, NTTP, no masses  MSK: No open wounds, no bruising, no LE edema  Neuro: A&Ox4, following commands, moving all four extremities spontaneously  Psych: appropriate mood, denies AH, VH, SI

## 2023-12-01 VITALS
DIASTOLIC BLOOD PRESSURE: 50 MMHG | HEART RATE: 73 BPM | RESPIRATION RATE: 20 BRPM | OXYGEN SATURATION: 100 % | TEMPERATURE: 98 F | SYSTOLIC BLOOD PRESSURE: 109 MMHG

## 2023-12-01 PROCEDURE — 73020 X-RAY EXAM OF SHOULDER: CPT | Mod: 26,RT,XE

## 2023-12-01 PROCEDURE — 73030 X-RAY EXAM OF SHOULDER: CPT | Mod: 26,RT

## 2023-12-01 RX ADMIN — ETOMIDATE 13 MILLIGRAM(S): 2 INJECTION INTRAVENOUS at 00:08

## 2023-12-01 RX ADMIN — FENTANYL CITRATE 50 MICROGRAM(S): 50 INJECTION INTRAVENOUS at 00:08

## 2023-12-01 NOTE — ED PROCEDURE NOTE - NS ED PROC PERFORMED BY1 FT
KRUPA MEDINA    [id number]    56y      Female    CC: hypernatremia     INTERVAL HPI/OVERNIGHT EVENTS: pt seen and examined. no acute events o/n     REVIEW OF SYSTEMS:    CONSTITUTIONAL: No fever, weight loss  RESPIRATORY: No cough, wheezing, hemoptysis; No shortness of breath  CARDIOVASCULAR: No chest pain, palpitations  GASTROINTESTINAL: No abdominal or epigastric pain. No nausea, vomiting  NEUROLOGICAL: No headaches, memory loss    Vital Signs Last 24 Hrs  T(C): 37.2 (10 Jose C 2021 08:44), Max: 37.2 (09 Jan 2021 21:24)  T(F): 99 (10 Jose C 2021 08:44), Max: 99 (10 Jose C 2021 08:44)  HR: 74 (10 Jose C 2021 08:44) (73 - 84)  BP: 130/92 (10 Jose C 2021 08:44) (130/92 - 158/125)  BP(mean): --  RR: 18 (10 Jose C 2021 08:44) (18 - 18)  SpO2: 94% (10 Jose C 2021 08:44) (92% - 97%)    PHYSICAL EXAM:    GENERAL: NAD  HEENT: PERRL, +EOMI  NECK: soft, supple  CHEST/LUNG: Clear to auscultation bilaterally; No wheezing  HEART: S1S2+, Regular rate and rhythm  ABDOMEN: Soft, Nontender, Nondistended; Bowel sounds present  SKIN: warm, dry  NEURO: AAOX3, no focal deficits    LABS:                        8.8    7.16  )-----------( 411      ( 09 Jan 2021 09:03 )             29.9     01-10    142  |  104  |  19.0  ----------------------------<  73  4.0   |  29.0  |  1.09    Ca    8.8      10 Jose C 2021 09:07  Mg     1.8     01-10    TPro  6.5<L>  /  Alb  3.0<L>  /  TBili  0.2<L>  /  DBili  x   /  AST  22  /  ALT  25  /  AlkPhos  87  01-10        MEDICATIONS  (STANDING):  amLODIPine   Tablet 5 milliGRAM(s) Oral daily  cefTRIAXone   IVPB 2000 milliGRAM(s) IV Intermittent every 24 hours  chlorhexidine 4% Liquid 1 Application(s) Topical <User Schedule>  desmopressin 0.1 milliGRAM(s) Oral two times a day  enoxaparin Injectable 40 milliGRAM(s) SubCutaneous daily  folic acid 1 milliGRAM(s) Oral daily  levETIRAcetam 500 milliGRAM(s) Oral two times a day  magnesium oxide 400 milliGRAM(s) Oral three times a day with meals  multivitamin 1 Tablet(s) Oral daily  pantoprazole    Tablet 40 milliGRAM(s) Oral before breakfast  sodium bicarbonate 1300 milliGRAM(s) Oral two times a day  vancomycin  IVPB 1000 milliGRAM(s) IV Intermittent every 24 hours    MEDICATIONS  (PRN):  acetaminophen   Tablet .. 650 milliGRAM(s) Oral every 6 hours PRN Temp greater or equal to 38.5C (101.3F)  sodium chloride 0.9% lock flush 10 milliLiter(s) IV Push every 1 hour PRN Pre/post blood products, medications, blood draw, and to maintain line patency      RADIOLOGY & ADDITIONAL TESTS:   Sincere

## 2023-12-01 NOTE — ED PROCEDURE NOTE - ATTENDING CONTRIBUTION TO CARE
I have personally discussed the indications, risks and benefits of the above procedure with the resident. I was present for key portions of the procedure and assisted when required. Brigida Carranza, ED Attending

## 2023-12-01 NOTE — ED ADULT NURSE REASSESSMENT NOTE - NS ED NURSE REASSESS COMMENT FT1
Pt resting in stretcher, offers relief of pain at this time. RR equal and unlabored, O2 sat 100% on RA, NSR on cardiac monitor, no acute distress noted at this time. Safety maintained, comfort provided, awaiting results of PO challenge at this time.

## 2023-12-03 NOTE — ED POST DISCHARGE NOTE - ADDITIONAL DOCUMENTATION
12/15/23 - received call from patient with update that she started PT and her pain is controlled and she is feeling well. - Berna

## 2023-12-03 NOTE — ED POST DISCHARGE NOTE - REASON FOR FOLLOW-UP
Other Telephone follow up  to see how patient feeling after receiving procedural sedation. Patient contact # [phone] message left with Call Back  P.A. number and hours for return call back. Telephone follow up  to see how patient feeling after receiving procedural sedation. Patient contact # [phone] message left with Call Back  P.A. number and hours for return call back. Telephone follow up  to see how patient feeling after receiving procedural sedation. Patient contact # [phone] message left with Call Back  P.A. number and hours for return call back.

## 2023-12-04 ENCOUNTER — NON-APPOINTMENT (OUTPATIENT)
Age: 48
End: 2023-12-04

## 2023-12-04 ENCOUNTER — APPOINTMENT (OUTPATIENT)
Dept: ORTHOPEDIC SURGERY | Facility: CLINIC | Age: 48
End: 2023-12-04
Payer: COMMERCIAL

## 2023-12-04 VITALS — WEIGHT: 190 LBS | BODY MASS INDEX: 32.44 KG/M2 | HEIGHT: 64 IN

## 2023-12-04 DIAGNOSIS — S43.004A UNSPECIFIED DISLOCATION OF RIGHT SHOULDER JOINT, INITIAL ENCOUNTER: ICD-10-CM

## 2023-12-04 PROCEDURE — 99204 OFFICE O/P NEW MOD 45 MIN: CPT

## 2023-12-07 RX ORDER — DICLOFENAC SODIUM 75 MG/1
75 TABLET, DELAYED RELEASE ORAL
Qty: 60 | Refills: 3 | Status: ACTIVE | COMMUNITY
Start: 2023-12-07 | End: 1900-01-01

## 2024-01-02 ENCOUNTER — APPOINTMENT (OUTPATIENT)
Dept: ORTHOPEDIC SURGERY | Facility: CLINIC | Age: 49
End: 2024-01-02
Payer: COMMERCIAL

## 2024-01-02 VITALS — HEIGHT: 64 IN | BODY MASS INDEX: 31.92 KG/M2 | WEIGHT: 187 LBS

## 2024-01-02 PROCEDURE — 99213 OFFICE O/P EST LOW 20 MIN: CPT

## 2024-01-02 NOTE — ADDENDUM
[FreeTextEntry1] : I, KELLY PATTON, acted solely as a scribe for Dr. Stanislav Riley on this date 01/02/2024.  All medical record entries made by the Scribe were at my, Dr. Stanislav Riley, direction and personally dictated by me on 01/02/2024. I have reviewed the chart and agree that the record accurately reflects my personal performance of the history, physical exam, assessment and plan. I have also personally directed, reviewed, and agreed with the chart.

## 2024-01-02 NOTE — PHYSICAL EXAM
.  Last appointment 3/31/2022  Next appointment   Future Appointments   Date Time Provider Violeta Elizabethi   5/31/2022  1:30 PM DO Hernan Mcmillan [facility]   6/8/2022  2:00 PM Anali Hylton MD [facility]   7/5/2022  2:00 PM Chen Merchant DO [place] [id number] [facility]      Last refill:  04/27/2022  DOS: 02/16/2022      Patient called in requesting refill of:    oxyCODONE-acetaminophen (PERCOCET)  MG per tablet       [facility] [address] [place] [address] - [address] - P [phone] Leah Coronado [phone]   [address] [place] [address] [de-identified] : Constitutional o Appearance : well-nourished, well developed, alert, in no acute distress  Head and Face o Head :  Inspection : atraumatic, normocephalic o Face :  Inspection : no visible rash or discoloration Respiratory o Respiratory Effort: breathing unlabored  Neurologic o Sensation : Normal sensation  Psychiatric o Mood and Affect: mood normal, affect appropriate  Lymphatic o Additional Nodes : No palpable lymph nodes present   Cervical Spine o Inspection/Palpation :  Inspection : alignment midline, normal degree of lordosis present  Skin : normal appearance, no masses or tenderness, trachea midline  Palpation : musculature is nontender to palpation o Range of Motion : arc of motion full in all planes, no crepitance or pain with ROM o Tests: Negative Spurlings test  Right Upper Extremity o Right Shoulder :  Inspection/Palpation : anterior and posterior tenderness, no swelling or deformities, normal sensation to light touch, ecchymosis on her right arm  Range of Motion : full forward flexion, full and symmetrical IR, full ER, no crepitance  Strength : forward elevation 5/5, IR 5/5, ER 5/5, ER at 90 of abduction 5/5, supraspinatus 5/5, adduction 5/5, abduction 5/5, biceps/triceps 5/5,  5/5, no wrist drop,   Stability : no joint instability on provocative testing   Tests: Hudson negative, Neer negative, Elio negative, drop arm test negative, Clearfield's test positive   o right Wrist:  Inspection/Palpation : no tenderness, no swelling or deformities  Range of Motion : full and painless in all planes, no crepitance  Strength : extension, flexion, ulnar deviation and radial deviation 5/5  Stability : no joint instability on provocative testing  Tests/Signs : Tinel's sign negative over carpal tunnel, negative Phalens, negative Finkelsteins test, negative grind test  Left Upper Extremity o Left Shoulder :  Inspection/Palpation : no tenderness, no swelling or deformities  Range of Motion : full and painless in all planes, no crepitance  Strength : forward elevation 5/5, IR 5/5, ER 5/5, ER at 90 of abduction 5/5, supraspinatus 5/5, adduction 5/5, abduction 5/5, biceps/triceps 5/5,  5/5  Stability : no joint instability on provocative testing   Tests: Hudson negative, Neer negative, Elio negative, drop arm test negative, Clearfield's test negative  Gait and Station: Gait: no cane, no significant extremity swelling or lymphedema, good proprioception and balance

## 2024-01-02 NOTE — HISTORY OF PRESENT ILLNESS
[de-identified] : 48-year-old RHD female presents for initial evaluation s/p right shoulder dislocation on 11/30/23. She states she is doing really well. She has been attending PT and is making progress. She wants to continue attending PT. She states she has been using 3-pound weights. Of note, she had brain cancer and has undergone 2 surgeries in her past. She had a stroke after her second surgery. She regained strength and function after her stroke. She presents with her .   J X-RAYS 11/30/2023 Radiology Results  o Right Shoulder : AP internal/external rotation and outlet views were obtained and revealed anterior dislocation with concentric reduction and severe degenerative arthritis

## 2024-01-02 NOTE — DISCUSSION/SUMMARY
[de-identified] : I went over the pathophysiology of the patient's symptoms in great detail with the patient. I am recommending the patient continues to go to physical therapy to obtain increases in their strength and mobility. A prescription was provided. We discussed the use of ice, Tylenol and anti-inflammatories to relieve pain. She should focus on light weight and high repetition exercises.   All of their questions were answered. They understand and consent to the plan.   FU in 6 weeks.

## 2024-01-10 NOTE — H&P ADULT - PROBLEM SELECTOR PLAN 4
It was a pleasure to see Lisy Medina in Pediatric Gastroenterology, Hepatology, and Nutrition Clinic at [facility].  I hope that this consultation meets her needs and your expectations.  Should you have further questions or concerns, please contact my team.    Lisy Medina is a 16mo female seen in clinic today in follow-up after recent admission to [facility] for Difficulty gaining weight  Since the last visit, she has had more intercurrent illnesses which resulted in poor intake and weight loss, but she is improving and her intake and weight gain have improved.  I have encouraged nutrient dense nutrition and provided samples of Pediasure.  I would prefer she consume solid whole foods, but in a bind, Pediasure will ensure she is getting what she needs.  She continues to have constipation episodically, so I have once more encouraged mother to be more consistent with Miralax and provide it in clear liquids to keep her stools milk shake to soft serve and moving.    We will need to continue to monitor her growth and development.  She may need to see nutrition again in the future.  ASSESSMENT/PLAN:  1. Pediatric feeding disorder, chronic    2. Mild malnutrition    3. Functional constipation    4. Slow transit constipation      RECOMMENDATIONS:  Patient Instructions    Reviewed growth chart and previous records.   Continue efforts with solid foods and nutrient dense foods.   Keep an eye on her poops- goal of type 4/5/6 daily to every other day.  Use water and juice to keep them soft.  Samples of Pediasure- one a day.  Continue to monitor her growth and development.  Mychart with questions or concerns.      Follow up: Follow up in about 4 months (around 5/10/2024).        -------------------------------------------------------------------------------------------------------------------------------------------------------------------------------------------------------------------------------------------------------------  HPI  Lisylesly Medina is a 16mo who returns to clinic in follow-up consultation from Bharati Davis MD  for malnutrition, pediatric feeding disorder, and constipation.  She is accompanied by her mother, who is an able historian.  Her last visit was on 8/29/2023.    Interval history:    Since the last visit, she has been eating well.  She is also busy.  No vomiting, nausea, or abdominal pain.      She is had the flu, pneumonia, and RSV in November.  PCP enecouraged her to continue to consume the formula.  She will only drink formula at night.  Mostly water or juice and regular foods at home.  She will eat what the family eats.  She is finishing plates at school.  26kcal/oz formula.       She has gained 990g since the last visit, which is only 7.3g/d.    Bowel Movements  Meconium passage was delayed due to prematurity.  Started having issues with constipation in the NICU.  No contrast enema.  Xrays.    Type 3/4/5.  The stools depend on what she has to eat.  No pain with defecation. She strains and grunts no matter the stools.  Not using Miralax because it is too runny.  Water and juice keep stools soft.      7/14/2023:  Nutrition consult  Recommendations:   Establish infant feeding schedule of Simiilac total comfort formula at 6 oz mixed up to 26kcal/oz. Suggested times of 4 bottles daily.    Recommend MVI daily.  - polviosl without iron  Offer age-appropriate solids up to 3-4x/day. Suggested times provided   Add some high calorie additives to some foods every now and then to meet needs overall.   Education Materials Provided and Discussed: Nutrition Plan  Education Needs Satisfied: yes   Patient Verbalizes understanding: yes   Barriers to Learning:  "none identified      M/E = NUTRITION MONITORING AND EVALUATION   SMART Goal 1: Weight increases by 10-16g/day for age per WHO and [facility].   SMART Goal 2: Diet recall shows intake of Similac Total Comfort formula mixed to 24 carlos eduardo/oz 5 ounces x/day and tolerating by next RD visit.   Indicators: Diet Recall/Weight     Follow Up:  8-10 Weeks  Communication with provider via Epic  Signature: Yanira Alvarado MS RDN LDN  Above nutrition documentation is in line with the ADIME criteria for Registered Dietitian Nutritionists.    LIFESTYLE  Diet:    formula  Total Comfort   Similac total comfort formula at 6 oz mixed up to 26kcal/oz.   Suggested times of 4 bottles daily.    Dairy does not make her skin worse.       Sleep:  She is a better sleeper now.  She takes naps.  Developmental Activity:  Remarkably doing well.    Father: 5'11"- lean  Mother: 5'5"    PMH  Past Medical History:   Diagnosis Date    Anemia of prematurity     Choking episode 2023    Choking and feeding issues.    Deceleration in weight gain 2023    Lisy has been plagued by intercurrent illnesses this month and her weight has reflected that with gradual deceleration making her more and more underweight  Weight Z scores- -3.15, to -3.29, to -3.49 today.  She is reportedly making up for lost time of poor PO intake, but she does not have the reserves.    Decreased oral intake 2023    Usually related to intercurrent illness  2023    Admission to [facility] May 2023       No admission but URI with poor PO.    Disturbance of cerebral status of      Eczema     Encounter for blood transfusion     per mother, pt received blood d/t not producting enough RBC    Extreme immaturity     gestational age 26 completed weeks    Gassiness 2023    Aerophagia from fussiness    Heart murmur     Retinopathy of prematurity     Sickle cell trait     Stage 2 necrotizing enterocolitis in      Vomiting 2023    " "Acute on chronic poor PO and vomiting.   Vomiting improving with one episode today. See "decreased oral intake" for plan.      Past Surgical History:   Procedure Laterality Date    MYRINGOTOMY WITH INSERTION OF VENTILATION TUBE Bilateral 1/19/2024    Procedure: MYRINGOTOMY, WITH TYMPANOSTOMY TUBE INSERTION;  Surgeon: Kyrie Sal MD;  Location: [facility];  Service: ENT;  Laterality: Bilateral;     Family History   Problem Relation Age of Onset    Hypertension Father     Diabetes Father         pre-diabetic    Hypertension Maternal Grandmother     Cancer Maternal Grandmother         Thyroid    Hyperlipidemia Maternal Grandfather     Hypertension Maternal Grandfather     Diabetes Paternal Grandmother     Hypertension Paternal Grandmother     Diabetes Paternal Grandfather     Hypertension Paternal Grandfather     Diabetes Other     Pacemaker/defibrilator Other     Kidney failure Other     Heart failure Other     Stroke Other       There is no direct family history of IBD, EOE, Celiac disease.  PGM has endometriosis.  MGM has constipation and stomach issues.  Mother has issues with cows milk.  Mother has eczema.      Social History     Socioeconomic History    Marital status: Single   Tobacco Use    Smoking status: Never     Passive exposure: Never    Smokeless tobacco: Never   Substance and Sexual Activity    Alcohol use: Never    Drug use: Never    Sexual activity: Never   Social History Narrative    Patient lives at home with mom and dad. No smokers in the house.      Review of patient's allergies indicates:  No Known Allergies    Current Outpatient Medications:     albuterol-ipratropium (DUO-NEB) 2.5 mg-0.5 mg/3 mL nebulizer solution, Take 3 mLs by nebulization every 6 (six) hours as needed for Wheezing. Rescue (Patient not taking: Reported on 2/2/2024), Disp: 75 mL, Rfl: 0    ciprofloxacin-hydrocortisone 0.2-1% (CIPRO HC OTIC) otic suspension, 3 drops 2 (two) times daily., Disp: , Rfl:     levalbuterol (XOPENEX) " 0.63 mg/3 mL nebulizer solution, Take 3 mLs (0.63 mg total) by nebulization every 6 (six) hours as needed for Wheezing or Shortness of Breath. Rescue (Patient not taking: Reported on 2/2/2024), Disp: 240 mL, Rfl: 0    loratadine (CLARITIN) 5 mg/5 mL syrup, Take 2.5 mLs (2.5 mg total) by mouth once daily. (Patient not taking: Reported on 2/2/2024), Disp: 150 mL, Rfl: 1    polyethylene glycol (GLYCOLAX) 17 gram PwPk, Take 4 g by mouth once daily. (Patient not taking: Reported on 2/2/2024), Disp: 30 packet, Rfl: 5      INVESTIGATIONS    Office Visit on 12/08/2023   Component Date Value    POC Rapid COVID 12/08/2023 Negative      Acceptab* 12/08/2023 Yes    ]  No results found.     2/15/2023  KUB    XR ABDOMEN AP 1 VIEW     CLINICAL HISTORY:   Abdominal pain, constipation due to outlet dysfunction, assess rectal stool burden.  History of medical NEC in nicu; Slow transit constipation     TECHNIQUE:   Single AP View of the abdomen was performed.     COMPARISON:   None     FINDINGS:   There is a moderate to large volume of stool seen throughout the colon, extending to level of the rectum, suggestive of constipation.  No definite evidence of small bowel obstruction.  No definite evidence of urolithiasis noting that bowel contents can obscure stones.  Our girl is constipated.  She has a large ball of stool in her rectum for which I would have you do a cleanout:  Goal of milk shake to soft serve stools daily        Component      Latest Ref Rng 3/10/2023   Color      Colorless, Light Yellow, Yellow, Dark Yellow, Straw, Odette  Light Denali !    Clarity      Clear  Excessively Turbid !    Specific Gravity UA      1.001 - 1.035  1.028    Glucose, UA      Negative mg/dL Negative    Protein, UA      Negative mg/dL 20    Bilirubin Urine      Negative  Negative    Urobilinogen Urine      negative E.U./DL Negative    pH, UA      5 - 8  5    HGB Urine      Negative  Small !    Ketones, UA      negative MG/DL Negative     Nitrite, UA      Negative  Negative    Leukocyte Esterase UA      Negative  Negative    Microscopic Needed? Yes    WBC, UA      None, 0-5 /hpf None Seen    Blood, UA      None Seen, 0-1, 1-5 /hpf 1-5    Epithelial Urine      None Seen, 0-1, 2-3, 3-5  0-1    Bacteria      None Seen /hpf None Seen    Amorphous Urine      NONE  Present !    Mucus      NONE  Present !    URINE COLLECTION SOURCE Urine Straight Cath    Creatinine      0.31 - 0.53 mg/dL 0.41    BUN      3 - 17 mg/dL 14    Sodium      139 - 146 mmol/L 136 (L)    Potassium      3.4 - 6.0 mmol/L 5.6    Chloride      98 - 107 mmol/L 105    CO2 Total      20 - 28 mmol/L 18 (L)    Glucose, Bld      60 - 100 mg/dL 71    Calcium      8.5 - 11.0 mg/dL 10.8    Total Protein      4.4 - 7.1 g/dL 6.2    Albumin      2.5 - 4.6 g/dl 4.5    Bilirubin Total      0.1 - 0.7 mg/dL 0.3    Alkaline Phosphatase      134 - 518 U/L 259    AST      20 - 67 U/L 38    ALT      5 - 33 U/L 29    Anion Gap      8 - 16 mmol/L 13    eGFR Non-African American --    Influenza A Antigen      Negative  Negative    Influenza B Antigen      Negative  Negative    SARS Antigen      Negative, See Comment  Negative    BLOOD GLUCOSE, BEDSIDE POC      60 - 100 mg/dL 73       ! Abnormal  (L) Low    Review of Systems   Constitutional: Negative.  Negative for crying.   HENT:  Positive for drooling (teething) and ear pain (on Augmentin for OM). Negative for congestion and trouble swallowing.    Eyes: Negative.    Respiratory: Negative.  Negative for choking and wheezing.    Cardiovascular: Negative.  Negative for cyanosis.   Gastrointestinal:  Positive for abdominal distention and constipation (stools are no longer hard, but she is not going daily).   Endocrine: Negative.    Genitourinary: Negative.    Musculoskeletal: Negative.    Skin:  Positive for rash (her eczema may be getting worse.  She needs Derm.).   Allergic/Immunologic: Negative.    Neurological: Negative.    Hematological: Negative.   "  Psychiatric/Behavioral:  Positive for sleep disturbance (restless sleeper; she is in her own bed now.).    All other systems reviewed and are negative.     A comprehensive review of symptoms was completed and negative except as noted above.    OBJECTIVE:  Vital Signs:  Vitals:    01/10/24 1132   Temp: 97.8 °F (36.6 °C)   TempSrc: Axillary   Weight: 8.1 kg (17 lb 13.7 oz)   Height: 2' 4.5" (0.724 m)            4 %ile (Z= -1.73) based on WHO (Girls, 0-2 years) weight-for-age data using vitals from 1/10/2024.   <1 %ile (Z= -2.47) based on WHO (Girls, 0-2 years) Length-for-age data based on Length recorded on 1/10/2024.  23 %ile (Z= -0.73) based on WHO (Girls, 0-2 years) weight-for-recumbent length data based on body measurements available as of 1/10/2024.  Body mass index is 15.46 kg/m². 39 %ile (Z= -0.28) based on WHO (Girls, 0-2 years) BMI-for-age based on BMI available as of 1/10/2024.  No blood pressure reading on file for this encounter.    Physical Exam  Vitals and nursing note reviewed.   Constitutional:       General: She is active. She is not in acute distress.     Appearance: Normal appearance. She is well-developed. She is not toxic-appearing.      Comments: Petite, awake and alert   HENT:      Head: Normocephalic and atraumatic.      Nose: Nose normal.      Mouth/Throat:      Mouth: Mucous membranes are moist.   Eyes:      Conjunctiva/sclera: Conjunctivae normal.      Pupils: Pupils are equal, round, and reactive to light.      Comments: Anicteric sclera   Cardiovascular:      Rate and Rhythm: Normal rate and regular rhythm.      Heart sounds: No murmur heard.  Pulmonary:      Effort: Pulmonary effort is normal.      Breath sounds: Normal breath sounds.   Abdominal:      General: Abdomen is flat. Bowel sounds are normal.      Palpations: Abdomen is soft.      Tenderness: There is no guarding or rebound.      Hernia: No hernia is present.   Genitourinary:     Comments: Normal female Malachi 1.  Normally " placed anus.  Musculoskeletal:         General: Normal range of motion.   Skin:     General: Skin is warm and dry.      Capillary Refill: Capillary refill takes less than 2 seconds.      Findings: Rash (eczema with patches of hypopigmentation) present.   Neurological:      General: No focal deficit present.      Mental Status: She is alert.      ___________________________________________    MD DAWNA [facility] PEDIATRIC GASTROENTEROLOGY  [facility] [place] [facility]   ____________________________________________      DVT ppx  - Lovenox

## 2024-02-06 ENCOUNTER — APPOINTMENT (OUTPATIENT)
Dept: ORTHOPEDIC SURGERY | Facility: CLINIC | Age: 49
End: 2024-02-06
Payer: COMMERCIAL

## 2024-02-06 VITALS — BODY MASS INDEX: 31.92 KG/M2 | WEIGHT: 187 LBS | HEIGHT: 64 IN

## 2024-02-06 DIAGNOSIS — M79.672 PAIN IN LEFT FOOT: ICD-10-CM

## 2024-02-06 PROCEDURE — 73630 X-RAY EXAM OF FOOT: CPT | Mod: LT

## 2024-02-06 PROCEDURE — 99214 OFFICE O/P EST MOD 30 MIN: CPT

## 2024-02-07 NOTE — HISTORY OF PRESENT ILLNESS
[de-identified] : 48-year-old female history of brain cancer status post resections presents with left foot pain after trip and fall.  Patient states she tripped over a pipe and twisted her left foot.  She has had pain in her left foot since then.  The injury happened 2 days ago.  She was seen in outside facility and placed into a splint.  Patient has not been putting weight on her left lower extremity since the injury.  She currently has pain.  Denies any numbness or tingling.  Denies pain elsewhere.  Of note patient sustained a right shoulder dislocation that is currently being treated by Dr. Riley

## 2024-02-07 NOTE — PHYSICAL EXAM
[de-identified] : Left lower extremity sign-no open wounds skin intact - Minimal swelling - Patient has significant point tenderness over the base of the first and second metatarsals near the Lisfranc and cuneiforms - Sensation intact to light touch throughout foot - Foot warm well-perfused - DP pulse palpable - Patient able to dorsiflex plantarflex ankle and toes [de-identified] : X-rays from urgent care were loaded into our system on a CD.  My independent interpretation of the left foot x-rays 3 views: There appears to be a minimally displaced fracture of the medial cuneiform.  There is not appear to be any naomi widening of the Lisfranc area.  No other fractures appreciated  Repeat x-rays of the left foot weightbearing were obtained today: My independent interpretation: Redemonstration of the medial cuneiform fracture.  No apparent widening of the Lisfranc joint.

## 2024-02-07 NOTE — ASSESSMENT
[FreeTextEntry1] : 48-year-old female with a left medial cuneiform fracture possible Lisfranc injury  - On x-rays from urgent care and repeat x-rays done today there is a medial cuneiform fracture.  This is in the vicinity of the Lisfranc ligament.  On x-ray there is no significant displacement.  Does not appear to be displacement of the Lisfranc on the weightbearing films either.  However due to the location of the fracture I would recommend a CT scan to further evaluate the fracture and potential for further disruption of that area.  Reason for the CT scan would be to assess the extent of the fracture.  CT scan was ordered today.  Patient to follow-up after CT scan is done to review results and go over potential treatment options.  At this time patient to remain in postoperative shoe.  Patient may be weight-bear as tolerated in a postoperative shoe to her left lower extremity.  Patient to follow-up after CT scan is completed

## 2024-02-12 ENCOUNTER — OUTPATIENT (OUTPATIENT)
Dept: OUTPATIENT SERVICES | Facility: HOSPITAL | Age: 49
LOS: 1 days | End: 2024-02-12
Payer: COMMERCIAL

## 2024-02-12 ENCOUNTER — RESULT REVIEW (OUTPATIENT)
Age: 49
End: 2024-02-12

## 2024-02-12 ENCOUNTER — APPOINTMENT (OUTPATIENT)
Age: 49
End: 2024-02-12
Payer: COMMERCIAL

## 2024-02-12 DIAGNOSIS — S92.245A NONDISPLACED FRACTURE OF MEDIAL CUNEIFORM OF LEFT FOOT, INITIAL ENCOUNTER FOR CLOSED FRACTURE: ICD-10-CM

## 2024-02-12 DIAGNOSIS — C71.2 MALIGNANT NEOPLASM OF TEMPORAL LOBE: Chronic | ICD-10-CM

## 2024-02-12 PROCEDURE — 76376 3D RENDER W/INTRP POSTPROCES: CPT

## 2024-02-12 PROCEDURE — 73700 CT LOWER EXTREMITY W/O DYE: CPT | Mod: 26,LT,MH

## 2024-02-12 PROCEDURE — 76376 3D RENDER W/INTRP POSTPROCES: CPT | Mod: 26

## 2024-02-12 PROCEDURE — 73700 CT LOWER EXTREMITY W/O DYE: CPT

## 2024-02-20 ENCOUNTER — APPOINTMENT (OUTPATIENT)
Dept: ORTHOPEDIC SURGERY | Facility: CLINIC | Age: 49
End: 2024-02-20
Payer: COMMERCIAL

## 2024-02-20 DIAGNOSIS — M19.019 PRIMARY OSTEOARTHRITIS, UNSPECIFIED SHOULDER: ICD-10-CM

## 2024-02-20 DIAGNOSIS — M47.812 SPONDYLOSIS W/OUT MYELOPATHY OR RADICULOPATHY, CERVICAL REGION: ICD-10-CM

## 2024-02-20 PROCEDURE — 99213 OFFICE O/P EST LOW 20 MIN: CPT

## 2024-02-20 NOTE — DISCUSSION/SUMMARY
[de-identified] : I went over the pathophysiology of the patient's symptoms in great detail with the patient. She does not need to continue PT at this time. If her right shoulder acts up, she will let me know.   All of their questions were answered. They understand and consent to the plan.   FU as needed.

## 2024-02-20 NOTE — HISTORY OF PRESENT ILLNESS
[de-identified] : 48-year-old RHD female presents for initial evaluation s/p right shoulder dislocation on 11/30/23. She states she is doing really well. She states she broke her left foot that is being treated by Dr. Vick the Podiatrist. She states she has not been able to attend PT due to her left foot. She states she has neck pain. Of note, she had brain cancer and has undergone 2 surgeries in her past. She had a stroke after her second surgery. She regained strength and function after her stroke. She presents with her .   [facility] X-RAYS 11/30/2023 Radiology Results  o Right Shoulder : AP internal/external rotation and outlet views were obtained and revealed anterior dislocation with concentric reduction and severe degenerative arthritis

## 2024-02-20 NOTE — ADDENDUM
[FreeTextEntry1] : I, KELLY PATTON, acted solely as a scribe for Dr. Stanislav Riley on this date 02/20/2024.  All medical record entries made by the Scribe were at my, Dr. Stanislav Riley, direction and personally dictated by me on 02/20/2024. I have reviewed the chart and agree that the record accurately reflects my personal performance of the history, physical exam, assessment and plan. I have also personally directed, reviewed, and agreed with the chart.

## 2024-02-20 NOTE — PHYSICAL EXAM
[de-identified] : Constitutional o Appearance : well-nourished, well developed, alert, in no acute distress  Head and Face o Head :  Inspection : atraumatic, normocephalic o Face :  Inspection : no visible rash or discoloration Respiratory o Respiratory Effort: breathing unlabored  Neurologic o Sensation : Normal sensation  Psychiatric o Mood and Affect: mood normal, affect appropriate  Lymphatic o Additional Nodes : No palpable lymph nodes present   Cervical Spine o Inspection/Palpation :  Inspection : alignment midline, normal degree of lordosis present  Skin : normal appearance, no masses or tenderness, trachea midline  Palpation : right paracervical tenderness minimal left paracervical tenderness  o Range of Motion : full ROM of her neck  o Tests: Negative Spurlings test  Right Upper Extremity o Right Shoulder :  Inspection/Palpation : anterior and posterior tenderness, no swelling or deformities, normal sensation to light touch, ecchymosis on her right arm  Range of Motion : full forward flexion, slight limited IR by two vererbale levels,  no crepitance  Strength : forward elevation 5/5, IR 5/5, ER 5/5, ER at 90 of abduction 5/5, supraspinatus 5/5, adduction 5/5, abduction 5/5, biceps/triceps 5/5,  5/5, no wrist drop,   Stability : no joint instability on provocative testing   Tests: Hudson negative, Neer negative, Elio negative, drop arm test negative, [place]'s test positive   o right Wrist:  Inspection/Palpation : no tenderness, no swelling or deformities  Range of Motion : full and painless in all planes, no crepitance  Strength : extension, flexion, ulnar deviation and radial deviation 5/5  Stability : no joint instability on provocative testing  Tests/Signs : Tinel's sign negative over carpal tunnel, negative Phalens, negative Finkelsteins test, negative grind test  Left Upper Extremity o Left Shoulder :  Inspection/Palpation : no tenderness, no swelling or deformities  Range of Motion : full and painless in all planes, no crepitance  Strength : forward elevation 5/5, IR 5/5, ER 5/5, ER at 90 of abduction 5/5, supraspinatus 5/5, adduction 5/5, abduction 5/5, biceps/triceps 5/5,  5/5  Stability : no joint instability on provocative testing   Tests: Hudson negative, Neer negative, Elio negative, drop arm test negative, [place]'s test negative  Gait and Station: Gait: no cane, no significant extremity swelling or lymphedema, good proprioception and balance

## 2024-02-20 NOTE — CONSULT LETTER
[Dear  ___] : Dear  [unfilled], [Consult Letter:] : I had the pleasure of evaluating your patient, [unfilled]. [Please see my note below.] : Please see my note below. [Consult Closing:] : Thank you very much for allowing me to participate in the care of this patient.  If you have any questions, please do not hesitate to contact me. [Sincerely,] : Sincerely, [FreeTextEntry2] : JEREMÍAS LANCASTER  [FreeTextEntry3] : Stanislav Riley M.D.

## 2024-03-12 ENCOUNTER — APPOINTMENT (OUTPATIENT)
Dept: ORTHOPEDIC SURGERY | Facility: CLINIC | Age: 49
End: 2024-03-12
Payer: COMMERCIAL

## 2024-03-12 VITALS — BODY MASS INDEX: 31.92 KG/M2 | WEIGHT: 187 LBS | HEIGHT: 64 IN

## 2024-03-12 PROCEDURE — 99213 OFFICE O/P EST LOW 20 MIN: CPT

## 2024-03-12 PROCEDURE — 73630 X-RAY EXAM OF FOOT: CPT | Mod: LT

## 2024-03-12 NOTE — PHYSICAL EXAM
[de-identified] : X-rays 3 views of the left foot: My interpretation rotation: This the space between the second metatarsal and the cuboid unchanged.  Cuboid fracture seems to be healing   CT foot left 2/16/24 There is an avulsion fracture arising from the lateral margin of the distal medial cuneiform. Small avulsion fracture flecks are seen along the plantar surface of the second metatarsal base. There is minimal offset of the second TMT. The findings raise concern for a Lisfranc fracture subluxation. [de-identified] : Left lower extremity -skin intact - no swelling -No tenderness anterior over Lisfranc.  Mild tenderness on the plantar surface - Sensation intact to light touch throughout foot - Foot warm well-perfused - DP pulse palpable - Patient able to dorsiflex plantarflex ankle and toes

## 2024-03-12 NOTE — ASSESSMENT
[FreeTextEntry1] : 48-year-old female with a left medial cuneiform fracture with CT evidence of Lisfranc injury.  Discussed his injury in detail with the patient.  Discussed the fractures of the Lisfranc area.  Clinically she is doing very well.  She is progressing able to ambulate.  And have minimal to no pain.  Given her good clinical status nonoperative treatment is a reasonable route to take.  Discussed this in detail with the patient.  Will continue nonoperative management.  Patient is to slowly progress out of the postop shoe.  She is to start this at home progressively more more.  Discussed with patient that if her pain worsens we can reevaluate.  Patient to follow-up in 2 months.

## 2024-03-12 NOTE — HISTORY OF PRESENT ILLNESS
[de-identified] : 48-year-old female history of brain cancer status post resections presents with left foot pain after trip and fall.  Patient states she tripped over a pipe and twisted her left foot.  She has had pain in her left foot since then.  The injury happened 2 days ago.  She was seen in outside facility and placed into a splint.  Patient has not been putting weight on her left lower extremity since the injury.  She currently has pain.  Denies any numbness or tingling.  Denies pain elsewhere.  Of note patient sustained a right shoulder dislocation that is currently being treated by Dr. Riley  3/12/2024: She is doing very well.  She is having no pain with ambulation in the postop shoe.  She went today without wearing the shoe doing a lot of walking and had some increased pain.  However overall she is doing well.  She is still improvement in her normal activities.

## 2024-05-03 ENCOUNTER — APPOINTMENT (OUTPATIENT)
Dept: ORTHOPEDIC SURGERY | Facility: CLINIC | Age: 49
End: 2024-05-03

## 2024-05-08 ENCOUNTER — APPOINTMENT (OUTPATIENT)
Dept: MRI IMAGING | Facility: IMAGING CENTER | Age: 49
End: 2024-05-08
Payer: COMMERCIAL

## 2024-05-08 ENCOUNTER — APPOINTMENT (OUTPATIENT)
Dept: NEUROLOGY | Facility: CLINIC | Age: 49
End: 2024-05-08
Payer: COMMERCIAL

## 2024-05-08 ENCOUNTER — OUTPATIENT (OUTPATIENT)
Dept: OUTPATIENT SERVICES | Facility: HOSPITAL | Age: 49
LOS: 1 days | End: 2024-05-08
Payer: COMMERCIAL

## 2024-05-08 VITALS
DIASTOLIC BLOOD PRESSURE: 70 MMHG | HEART RATE: 64 BPM | OXYGEN SATURATION: 98 % | SYSTOLIC BLOOD PRESSURE: 110 MMHG | TEMPERATURE: 98 F | HEIGHT: 64 IN | RESPIRATION RATE: 16 BRPM | WEIGHT: 180 LBS | BODY MASS INDEX: 30.73 KG/M2

## 2024-05-08 DIAGNOSIS — C71.2 MALIGNANT NEOPLASM OF TEMPORAL LOBE: Chronic | ICD-10-CM

## 2024-05-08 DIAGNOSIS — C71.9 MALIGNANT NEOPLASM OF BRAIN, UNSPECIFIED: ICD-10-CM

## 2024-05-08 PROCEDURE — A9585: CPT

## 2024-05-08 PROCEDURE — 99213 OFFICE O/P EST LOW 20 MIN: CPT

## 2024-05-08 PROCEDURE — 70553 MRI BRAIN STEM W/O & W/DYE: CPT

## 2024-05-08 PROCEDURE — 70553 MRI BRAIN STEM W/O & W/DYE: CPT | Mod: 26,MH

## 2024-05-08 RX ORDER — METHYLPHENIDATE HYDROCHLORIDE 10 MG/1
10 TABLET ORAL TWICE DAILY
Refills: 0 | Status: ACTIVE | COMMUNITY
Start: 2022-11-03

## 2024-05-08 RX ORDER — TOPIRAMATE 100 MG/1
100 TABLET, FILM COATED ORAL
Refills: 0 | Status: ACTIVE | COMMUNITY
Start: 2022-05-19

## 2024-05-08 NOTE — DISCUSSION/SUMMARY
[FreeTextEntry1] : IDH mutant 1p19q intact anaplastic glioma, MGMT methylated.  S/p RT/TMZ.  Scan stable.    TUMOR:   new scan for 6 months coordinated  EPILEPSY:  TPX stable dose 100/125  RTC 6mo

## 2024-05-08 NOTE — HISTORY OF PRESENT ILLNESS
[FreeTextEntry1] : NEURO-ONCOLOGY  This 49 year old right handed woman is seen in followup for evaluation and management of glioma  She presented in 12/14 with a seizure.  She had a right temporal tumor biopsied then resected in staged procedure in 1/15 by Dr. Baez at [facility] [place]. Course was complicated by perioperative stroke in the right CR.  Pathology revealed IDH mutant glioma, astrocytic, 1p19q intact, MGMT methylated.  She had RT and temozolomide, followed by 6 cycles of TMZ, last in 10/15.   She followed with Dr. Duffy, then with Dr. Huang.  Clinical issues have been chronic headaches, and pre-existing psychiatric condition (bipolar spectrum).  Her father, a retired physician, oversees her care.    INTERVAL HISTORY: MRI brain is stable.  Headaches have resolved.  Fractured her foot this winter.  Healed.   PMH:   Bipolar.   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  .  Lives in same building as parents.  Nonsmoker. nondrinker FHX:  no brain tumors

## 2024-05-08 NOTE — DATA REVIEWED
[de-identified] : I personally reviewed available MRIs including 5/24 compared with 11/23 compared with 9/23 compared with 5/23 compared with 11/22 MRI compared with 5/22, and prior going back to 2018 and prior from 2015 and 2014 there is no new enhancement or progressive FLAIR. The vents are stable.  The scan appears essentially the same going back to 2018.

## 2024-06-18 ENCOUNTER — APPOINTMENT (OUTPATIENT)
Dept: ORTHOPEDIC SURGERY | Facility: CLINIC | Age: 49
End: 2024-06-18
Payer: COMMERCIAL

## 2024-06-18 VITALS — BODY MASS INDEX: 30.73 KG/M2 | WEIGHT: 180 LBS | HEIGHT: 64 IN

## 2024-06-18 DIAGNOSIS — S92.245S: ICD-10-CM

## 2024-06-18 DIAGNOSIS — S92.245A NONDISPLACED FRACTURE OF MEDIAL CUNEIFORM OF LEFT FOOT, INITIAL ENCOUNTER FOR CLOSED FRACTURE: ICD-10-CM

## 2024-06-18 PROCEDURE — 73630 X-RAY EXAM OF FOOT: CPT | Mod: LT

## 2024-06-18 PROCEDURE — 99213 OFFICE O/P EST LOW 20 MIN: CPT

## 2024-06-18 NOTE — ASSESSMENT
[FreeTextEntry1] : 48-year-old female with a left medial cuneiform fracture with CT evidence of Lisfranc injury. - patient doing very well. no issues, no pain, progressing well - xr shows healed cuneiform fracture - patient may continue to be WBAT, activities as tolerated - f/u as needed

## 2024-06-18 NOTE — HISTORY OF PRESENT ILLNESS
[de-identified] : 48-year-old female history of brain cancer status post resections presents with left foot pain after trip and fall.  Patient states she tripped over a pipe and twisted her left foot.  She has had pain in her left foot since then.  The injury happened 2 days ago.  She was seen in outside facility and placed into a splint.  Patient has not been putting weight on her left lower extremity since the injury.  She currently has pain.  Denies any numbness or tingling.  Denies pain elsewhere.  Of note patient sustained a right shoulder dislocation that is currently being treated by Dr. Riley  3/12/2024: She is doing very well.  She is having no pain with ambulation in the postop shoe.  She went today without wearing the shoe doing a lot of walking and had some increased pain.  However overall she is doing well.  She is still improvement in her normal activities.  6/18/24: denies any pain in her left foot, doing well

## 2024-06-18 NOTE — PHYSICAL EXAM
[de-identified] : Left lower extremity -skin intact - no swelling -No tenderness about foot - Sensation intact to light touch throughout foot - Foot warm well-perfused - DP pulse palpable - Patient able to dorsiflex plantarflex ankle and toes [de-identified] : X-rays 3 views of the left foot: My interpretation rotation: medial cuneiform fracture healed

## 2024-10-08 ENCOUNTER — APPOINTMENT (OUTPATIENT)
Dept: MRI IMAGING | Facility: IMAGING CENTER | Age: 49
End: 2024-10-08

## 2024-10-08 ENCOUNTER — APPOINTMENT (OUTPATIENT)
Dept: NEUROLOGY | Facility: CLINIC | Age: 49
End: 2024-10-08
Payer: COMMERCIAL

## 2024-10-08 ENCOUNTER — OUTPATIENT (OUTPATIENT)
Dept: OUTPATIENT SERVICES | Facility: HOSPITAL | Age: 49
LOS: 1 days | End: 2024-10-08
Payer: MEDICARE

## 2024-10-08 VITALS
SYSTOLIC BLOOD PRESSURE: 125 MMHG | RESPIRATION RATE: 16 BRPM | TEMPERATURE: 98.7 F | HEART RATE: 71 BPM | OXYGEN SATURATION: 99 % | DIASTOLIC BLOOD PRESSURE: 84 MMHG

## 2024-10-08 DIAGNOSIS — C71.9 MALIGNANT NEOPLASM OF BRAIN, UNSPECIFIED: ICD-10-CM

## 2024-10-08 DIAGNOSIS — C71.2 MALIGNANT NEOPLASM OF TEMPORAL LOBE: Chronic | ICD-10-CM

## 2024-10-08 PROCEDURE — 99214 OFFICE O/P EST MOD 30 MIN: CPT

## 2024-10-08 PROCEDURE — 70553 MRI BRAIN STEM W/O & W/DYE: CPT | Mod: 26,MH

## 2024-11-19 PROCEDURE — 70553 MRI BRAIN STEM W/O & W/DYE: CPT

## 2024-11-19 PROCEDURE — A9585: CPT

## 2025-01-18 NOTE — ED PROVIDER NOTE - OBJECTIVE STATEMENT
Called patient's son at mobile, no answer.  Called patient's son at home, no answer received voice machine.  Left message that patient has tested positive for COVID.  Also informed him that patient has gotten sicker this afternoon with increased abdominal bloating and I expressed my worry that patient would become even more sick this evening.  I told him that we are planning a CT scan and that gastroenterology was following along to manage.  I asked if he possibly contact his grandson, Prem DIETRICH, and be prepared to come up to the hospital or to call the hospital when he received this message.  I urged him to be cautious should he attempt to drive the hospital at this time, and encouraged him again to call the hospital.   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 Patient was in usual state of health until yesterday around 8pm endorsing described as sharp, periumbilical, constant.  Endorsing vomiting since onset of symptoms, bilious and blood tingling. Last episode of vomiting at 2am today. No diarrhea, not passing gas. Last BM yesterday, noted to be darker brown. No urinary complaints. Endorsing belly distention since onset of symptoms. No fever  Hx of tubal ligation 2010.  Dr. Aiden West 43 y/o female w/ hx of astrocytoma (R temporal lobe) s/p adjuvant chemo/RT and resection x2 c/b perioperative CVA p/w LLQ pain for 1 day. Patient was in usual state of health until yesterday around 8pm endorsing described as sharp, periumbilical, constant.  Endorsing vomiting since onset of symptoms, bilious and blood tingling. Last episode of vomiting at 2am today. No diarrhea, not passing gas. Last BM yesterday, noted to be darker brown. No urinary complaints. Endorsing belly distention since onset of symptoms. No fever  Hx of tubal ligation 2010.  Dr. Aiden West

## 2025-04-07 ENCOUNTER — APPOINTMENT (OUTPATIENT)
Dept: MRI IMAGING | Facility: IMAGING CENTER | Age: 50
End: 2025-04-07

## 2025-04-30 ENCOUNTER — APPOINTMENT (OUTPATIENT)
Dept: NEUROLOGY | Facility: CLINIC | Age: 50
End: 2025-04-30
Payer: COMMERCIAL

## 2025-04-30 ENCOUNTER — NON-APPOINTMENT (OUTPATIENT)
Age: 50
End: 2025-04-30

## 2025-04-30 ENCOUNTER — APPOINTMENT (OUTPATIENT)
Dept: MRI IMAGING | Facility: IMAGING CENTER | Age: 50
End: 2025-04-30
Payer: COMMERCIAL

## 2025-04-30 ENCOUNTER — OUTPATIENT (OUTPATIENT)
Dept: OUTPATIENT SERVICES | Facility: HOSPITAL | Age: 50
LOS: 1 days | End: 2025-04-30
Payer: COMMERCIAL

## 2025-04-30 VITALS
RESPIRATION RATE: 16 BRPM | OXYGEN SATURATION: 98 % | WEIGHT: 180 LBS | HEART RATE: 74 BPM | TEMPERATURE: 98.6 F | DIASTOLIC BLOOD PRESSURE: 86 MMHG | SYSTOLIC BLOOD PRESSURE: 122 MMHG | BODY MASS INDEX: 30.73 KG/M2 | HEIGHT: 64 IN

## 2025-04-30 DIAGNOSIS — R41.3 OTHER AMNESIA: ICD-10-CM

## 2025-04-30 DIAGNOSIS — C71.2 MALIGNANT NEOPLASM OF TEMPORAL LOBE: Chronic | ICD-10-CM

## 2025-04-30 DIAGNOSIS — C71.8 MALIGNANT NEOPLASM OF OVERLAPPING SITES OF BRAIN: ICD-10-CM

## 2025-04-30 DIAGNOSIS — C71.9 MALIGNANT NEOPLASM OF BRAIN, UNSPECIFIED: ICD-10-CM

## 2025-04-30 PROCEDURE — 70553 MRI BRAIN STEM W/O & W/DYE: CPT

## 2025-04-30 PROCEDURE — 70553 MRI BRAIN STEM W/O & W/DYE: CPT | Mod: 26

## 2025-04-30 PROCEDURE — A9585: CPT

## 2025-04-30 PROCEDURE — 99214 OFFICE O/P EST MOD 30 MIN: CPT
